# Patient Record
Sex: MALE | Race: WHITE | NOT HISPANIC OR LATINO | Employment: OTHER | URBAN - METROPOLITAN AREA
[De-identification: names, ages, dates, MRNs, and addresses within clinical notes are randomized per-mention and may not be internally consistent; named-entity substitution may affect disease eponyms.]

---

## 2019-07-12 ENCOUNTER — OFFICE VISIT (OUTPATIENT)
Dept: URGENT CARE | Facility: CLINIC | Age: 79
End: 2019-07-12
Payer: COMMERCIAL

## 2019-07-12 VITALS
WEIGHT: 189 LBS | OXYGEN SATURATION: 94 % | HEIGHT: 71 IN | SYSTOLIC BLOOD PRESSURE: 159 MMHG | BODY MASS INDEX: 26.46 KG/M2 | DIASTOLIC BLOOD PRESSURE: 72 MMHG | HEART RATE: 56 BPM | RESPIRATION RATE: 16 BRPM | TEMPERATURE: 96.5 F

## 2019-07-12 DIAGNOSIS — R10.32 LEFT LOWER QUADRANT PAIN: Primary | ICD-10-CM

## 2019-07-12 PROCEDURE — 99203 OFFICE O/P NEW LOW 30 MIN: CPT | Performed by: EMERGENCY MEDICINE

## 2019-07-12 RX ORDER — RIBOFLAVIN (VITAMIN B2) 100 MG
100 TABLET ORAL DAILY
COMMUNITY

## 2019-07-12 RX ORDER — TAMSULOSIN HYDROCHLORIDE 0.4 MG/1
CAPSULE ORAL
Refills: 0 | COMMUNITY
Start: 2019-05-25

## 2019-07-12 RX ORDER — METRONIDAZOLE 7.5 MG/G
1 GEL TOPICAL 2 TIMES DAILY
Refills: 0 | COMMUNITY
Start: 2019-04-24

## 2019-07-12 RX ORDER — MONTELUKAST SODIUM 10 MG/1
TABLET ORAL
Refills: 0 | COMMUNITY
Start: 2019-05-01

## 2019-07-12 RX ORDER — ROSUVASTATIN CALCIUM 10 MG/1
10 TABLET, COATED ORAL DAILY
Refills: 0 | COMMUNITY
Start: 2019-05-06

## 2019-07-12 NOTE — PROGRESS NOTES
[facility] [facility]        NAME: Price Roca is a 70 y o  male  : 1940    MRN: [id number]  DATE: 2019  TIME: 12:34 PM    Assessment and Plan   Left lower quadrant pain [R10 32]  1  Left lower quadrant pain  Ambulatory Referral to Emergency Medicine         Patient Instructions     Patient Instructions   Abdominal Pain   WHAT YOU NEED TO KNOW:   Abdominal pain can be dull, achy, or sharp  You may have pain in one area of your abdomen, or in your entire abdomen  Your pain may be caused by a condition such as constipation, food sensitivity or poisoning, infection, or a blockage  Abdominal pain can also be from a hernia, appendicitis, or an ulcer  Liver, gallbladder, or kidney conditions can also cause abdominal pain  The cause of your abdominal pain may be unknown  DISCHARGE INSTRUCTIONS:   Return to the emergency department if:   · You have new chest pain or shortness of breath  · You have pulsing pain in your upper abdomen or lower back that suddenly becomes constant  · Your pain is in the right lower abdominal area and worsens with movement  · You have a fever over 100 4°F (38°C) or shaking chills  · You are vomiting and cannot keep food or liquids down  · Your pain does not improve or gets worse over the next 8 to 12 hours  · You see blood in your vomit or bowel movements, or they look black and tarry  · Your skin or the whites of your eyes turn yellow  · You are a woman and have a large amount of vaginal bleeding that is not your monthly period  Contact your healthcare provider if:   · You have pain in your lower back  · You are a man and have pain in your testicles  · You have pain when you urinate  · You have questions or concerns about your condition or care  Follow up with your healthcare provider within 24 hours or as directed:  Write down your questions so you remember to ask them during your visits     Medicines:   · Medicines  may be given to calm your stomach and prevent vomiting or to decrease pain  Ask how to take pain medicine safely  · Take your medicine as directed  Contact your healthcare provider if you think your medicine is not helping or if you have side effects  Tell him of her if you are allergic to any medicine  Keep a list of the medicines, vitamins, and herbs you take  Include the amounts, and when and why you take them  Bring the list or the pill bottles to follow-up visits  Carry your medicine list with you in case of an emergency  © 2017 [employer] Information is for End User's use only and may not be sold, redistributed or otherwise used for commercial purposes  All illustrations and images included in CareNotes® are the copyrighted property of [employer]  or [employer]  The above information is an  only  It is not intended as medical advice for individual conditions or treatments  Talk to your doctor, nurse or pharmacist before following any medical regimen to see if it is safe and effective for you  Follow up with PCP in 3-5 days  Proceed to  ER if symptoms worsen  Chief Complaint     Chief Complaint   Patient presents with    Abdominal Pain     LLQ pain starting a couple hours ago, pain is constant  having nausea         History of Present Illness       Patient complains sudden onset of left lower quadrant dull pain 2 hours ago with nausea while he was at [facility] with his wife who was having outpatient knee surgery  As he was driving her home he noted the pain worsening  He had resection of a 10 in section of colon last year at [facility]   He is not clear about the details but  he believes it was due to some type of infection as he was placed in the hospital on antibiotics for several days before they decided to remove the portion of the colon  Patient claims the abdominal pain has improved since he was in our exam room        Review of Systems Review of Systems   Constitutional: Positive for appetite change  Negative for chills and fever  Respiratory: Negative for cough, shortness of breath and wheezing  Cardiovascular: Negative for chest pain and palpitations  Gastrointestinal: Positive for abdominal pain and nausea  Negative for abdominal distention, anal bleeding, blood in stool, constipation, diarrhea, rectal pain and vomiting  Genitourinary: Negative for flank pain  Musculoskeletal: Negative for back pain and neck stiffness  Skin: Negative for pallor  Neurological: Negative for syncope, light-headedness and headaches  Current Medications       Current Outpatient Medications:     Ascorbic Acid (VITAMIN C) 100 MG tablet, Take 100 mg by mouth daily, Disp: , Rfl:     calcium acetate (PHOSLO) 667 mg capsule, Take 1,334 mg by mouth 3 (three) times a day with meals, Disp: , Rfl:     diclofenac sodium (VOLTAREN) 1 %, , Disp: , Rfl: 0    metoprolol tartrate (LOPRESSOR) 25 mg tablet, Take 12 5 mg by mouth 2 (two) times a day, Disp: , Rfl: 0    metroNIDAZOLE (METROGEL) 0 75 % gel, Apply 1 application topically 2 (two) times a day To affected area, Disp: , Rfl: 0    montelukast (SINGULAIR) 10 mg tablet, , Disp: , Rfl: 0    Naproxen Sodium (ALEVE PO), Take by mouth, Disp: , Rfl:     rosuvastatin (CRESTOR) 10 MG tablet, Take 10 mg by mouth daily, Disp: , Rfl: 0    tamsulosin (FLOMAX) 0 4 mg, , Disp: , Rfl: 0    UNKNOWN TO PATIENT, , Disp: , Rfl:     Current Allergies     Allergies as of 07/12/2019    (No Known Allergies)            The following portions of the patient's history were reviewed and updated as appropriate: allergies, current medications, past family history, past medical history, past social history, past surgical history and problem list      Past Medical History:   Diagnosis Date    Asthma     Shoulder pain        Past Surgical History:   Procedure Laterality Date    COLECTOMY  2018       History reviewed   No pertinent family history  Medications have been verified  Objective   /72   Pulse 56   Temp (!) 96 5 °F (35 8 °C) (Tympanic)   Resp 16   Ht 5' 10 5" (1 791 m)   Wt 85 7 kg (189 lb)   SpO2 94%   BMI 26 74 kg/m²        Physical Exam     Physical Exam   Constitutional: He is oriented to person, place, and time  He appears well-developed and well-nourished  No distress  HENT:   Head: Normocephalic and atraumatic  Cardiovascular: Normal rate, regular rhythm and normal heart sounds  Pulmonary/Chest: Effort normal and breath sounds normal    Abdominal: Soft  Bowel sounds are normal  He exhibits no distension and no mass  There is no tenderness  There is no rigidity, no rebound and no guarding  Musculoskeletal: Normal range of motion  Neurological: He is alert and oriented to person, place, and time  Skin: Skin is warm and dry  He is not diaphoretic  No pallor  Psychiatric: He has a normal mood and affect  His behavior is normal  Judgment and thought content normal    Nursing note and vitals reviewed

## 2019-07-12 NOTE — PATIENT INSTRUCTIONS
Abdominal Pain   WHAT YOU NEED TO KNOW:   Abdominal pain can be dull, achy, or sharp  You may have pain in one area of your abdomen, or in your entire abdomen  Your pain may be caused by a condition such as constipation, food sensitivity or poisoning, infection, or a blockage  Abdominal pain can also be from a hernia, appendicitis, or an ulcer  Liver, gallbladder, or kidney conditions can also cause abdominal pain  The cause of your abdominal pain may be unknown  DISCHARGE INSTRUCTIONS:   Return to the emergency department if:   · You have new chest pain or shortness of breath  · You have pulsing pain in your upper abdomen or lower back that suddenly becomes constant  · Your pain is in the right lower abdominal area and worsens with movement  · You have a fever over 100 4°F (38°C) or shaking chills  · You are vomiting and cannot keep food or liquids down  · Your pain does not improve or gets worse over the next 8 to 12 hours  · You see blood in your vomit or bowel movements, or they look black and tarry  · Your skin or the whites of your eyes turn yellow  · You are a woman and have a large amount of vaginal bleeding that is not your monthly period  Contact your healthcare provider if:   · You have pain in your lower back  · You are a man and have pain in your testicles  · You have pain when you urinate  · You have questions or concerns about your condition or care  Follow up with your healthcare provider within 24 hours or as directed:  Write down your questions so you remember to ask them during your visits  Medicines:   · Medicines  may be given to calm your stomach and prevent vomiting or to decrease pain  Ask how to take pain medicine safely  · Take your medicine as directed  Contact your healthcare provider if you think your medicine is not helping or if you have side effects  Tell him of her if you are allergic to any medicine   Keep a list of the medicines, vitamins, and herbs you take  Include the amounts, and when and why you take them  Bring the list or the pill bottles to follow-up visits  Carry your medicine list with you in case of an emergency  © 2017 [employer] Information is for End User's use only and may not be sold, redistributed or otherwise used for commercial purposes  All illustrations and images included in CareNotes® are the copyrighted property of [employer]  or [employer]  The above information is an  only  It is not intended as medical advice for individual conditions or treatments  Talk to your doctor, nurse or pharmacist before following any medical regimen to see if it is safe and effective for you

## 2020-05-11 ENCOUNTER — HOSPITAL ENCOUNTER (EMERGENCY)
Facility: HOSPITAL | Age: 80
Discharge: HOME | End: 2020-05-11
Attending: EMERGENCY MEDICINE | Admitting: EMERGENCY MEDICINE
Payer: COMMERCIAL

## 2020-05-11 ENCOUNTER — APPOINTMENT (EMERGENCY)
Dept: CT IMAGING | Facility: HOSPITAL | Age: 80
End: 2020-05-11
Payer: COMMERCIAL

## 2020-05-11 VITALS
DIASTOLIC BLOOD PRESSURE: 93 MMHG | SYSTOLIC BLOOD PRESSURE: 195 MMHG | OXYGEN SATURATION: 97 % | HEIGHT: 70 IN | RESPIRATION RATE: 20 BRPM | HEART RATE: 68 BPM | WEIGHT: 190.04 LBS | BODY MASS INDEX: 27.21 KG/M2 | TEMPERATURE: 97.7 F

## 2020-05-11 DIAGNOSIS — S01.81XA FACIAL LACERATION: ICD-10-CM

## 2020-05-11 DIAGNOSIS — S09.90XA HEAD INJURY: ICD-10-CM
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
## 2020-05-11 PROCEDURE — 12013 RPR F/E/E/N/L/M 2.6-5.0 CM: CPT | Performed by: EMERGENCY MEDICINE

## 2020-05-11 PROCEDURE — 99282 EMERGENCY DEPT VISIT SF MDM: CPT | Performed by: EMERGENCY MEDICINE

## 2020-05-11 PROCEDURE — 70450 CT HEAD/BRAIN W/O DYE: CPT

## 2020-05-11 PROCEDURE — 99283 EMERGENCY DEPT VISIT LOW MDM: CPT

## 2020-05-11 PROCEDURE — 70486 CT MAXILLOFACIAL W/O DYE: CPT

## 2020-05-11 RX ORDER — ACETAMINOPHEN 325 MG/1
975 TABLET ORAL ONCE
Status: COMPLETED | OUTPATIENT
Start: 2020-05-11 | End: 2020-05-11

## 2020-05-11 RX ORDER — LIDOCAINE HYDROCHLORIDE AND EPINEPHRINE 10; 10 MG/ML; UG/ML
20 INJECTION, SOLUTION INFILTRATION; PERINEURAL ONCE
Status: COMPLETED | OUTPATIENT
Start: 2020-05-11 | End: 2020-05-11

## 2020-05-11 RX ADMIN — LIDOCAINE HYDROCHLORIDE AND EPINEPHRINE 20 ML: 10; 10 INJECTION, SOLUTION INFILTRATION; PERINEURAL at 18:35

## 2020-05-11 RX ADMIN — ACETAMINOPHEN 975 MG: 325 TABLET ORAL at 18:34

## 2020-08-28 ENCOUNTER — HOSPITAL ENCOUNTER (EMERGENCY)
Facility: HOSPITAL | Age: 80
Discharge: HOME | End: 2020-08-28
Attending: EMERGENCY MEDICINE | Admitting: EMERGENCY MEDICINE
Payer: COMMERCIAL

## 2020-08-28 ENCOUNTER — APPOINTMENT (EMERGENCY)
Dept: RADIOLOGY | Facility: HOSPITAL | Age: 80
End: 2020-08-28
Payer: COMMERCIAL

## 2020-08-28 ENCOUNTER — APPOINTMENT (EMERGENCY)
Dept: CT IMAGING | Facility: HOSPITAL | Age: 80
End: 2020-08-28
Payer: COMMERCIAL

## 2020-08-28 VITALS
WEIGHT: 164 LBS | TEMPERATURE: 97 F | OXYGEN SATURATION: 96 % | DIASTOLIC BLOOD PRESSURE: 73 MMHG | HEART RATE: 103 BPM | RESPIRATION RATE: 20 BRPM | HEIGHT: 70 IN | BODY MASS INDEX: 23.48 KG/M2 | SYSTOLIC BLOOD PRESSURE: 125 MMHG

## 2020-08-28 DIAGNOSIS — U07.1 COVID-19: Primary | ICD-10-CM

## 2020-08-28 LAB
ALBUMIN SERPL BCP-MCNC: 3.7 G/DL (ref 3.5–5)
ALP SERPL-CCNC: 61 U/L (ref 46–116)
ALT SERPL W P-5'-P-CCNC: 42 U/L (ref 12–78)
ANION GAP SERPL CALCULATED.3IONS-SCNC: 9 MMOL/L (ref 4–13)
AST SERPL W P-5'-P-CCNC: 45 U/L (ref 5–45)
BASOPHILS # BLD AUTO: 0.01 THOUSANDS/ΜL (ref 0–0.1)
BASOPHILS NFR BLD AUTO: 0 % (ref 0–1)
BILIRUB SERPL-MCNC: 0.78 MG/DL (ref 0.2–1)
BUN SERPL-MCNC: 21 MG/DL (ref 5–25)
CALCIUM SERPL-MCNC: 9.1 MG/DL (ref 8.3–10.1)
CHLORIDE SERPL-SCNC: 102 MMOL/L (ref 100–108)
CO2 SERPL-SCNC: 28 MMOL/L (ref 21–32)
CREAT SERPL-MCNC: 0.99 MG/DL (ref 0.6–1.3)
EOSINOPHIL # BLD AUTO: 0 THOUSAND/ΜL (ref 0–0.61)
EOSINOPHIL NFR BLD AUTO: 0 % (ref 0–6)
ERYTHROCYTE [DISTWIDTH] IN BLOOD BY AUTOMATED COUNT: 11.5 % (ref 11.6–15.1)
GFR SERPL CREATININE-BSD FRML MDRD: 73 ML/MIN/1.73SQ M
GLUCOSE SERPL-MCNC: 113 MG/DL (ref 65–140)
HCT VFR BLD AUTO: 45.1 % (ref 36.5–49.3)
HGB BLD-MCNC: 15.4 G/DL (ref 12–17)
IMM GRANULOCYTES # BLD AUTO: 0.01 THOUSAND/UL (ref 0–0.2)
IMM GRANULOCYTES NFR BLD AUTO: 0 % (ref 0–2)
LACTATE SERPL-SCNC: 1.3 MMOL/L (ref 0.5–2)
LYMPHOCYTES # BLD AUTO: 0.74 THOUSANDS/ΜL (ref 0.6–4.47)
LYMPHOCYTES NFR BLD AUTO: 19 % (ref 14–44)
MCH RBC QN AUTO: 31 PG (ref 26.8–34.3)
MCHC RBC AUTO-ENTMCNC: 34.1 G/DL (ref 31.4–37.4)
MCV RBC AUTO: 91 FL (ref 82–98)
MONOCYTES # BLD AUTO: 0.66 THOUSAND/ΜL (ref 0.17–1.22)
MONOCYTES NFR BLD AUTO: 17 % (ref 4–12)
NEUTROPHILS # BLD AUTO: 2.45 THOUSANDS/ΜL (ref 1.85–7.62)
NEUTS SEG NFR BLD AUTO: 64 % (ref 43–75)
NRBC BLD AUTO-RTO: 0 /100 WBCS
PLATELET # BLD AUTO: 103 THOUSANDS/UL (ref 149–390)
PMV BLD AUTO: 11.4 FL (ref 8.9–12.7)
POTASSIUM SERPL-SCNC: 4.4 MMOL/L (ref 3.5–5.3)
PROT SERPL-MCNC: 7.1 G/DL (ref 6.4–8.2)
RBC # BLD AUTO: 4.96 MILLION/UL (ref 3.88–5.62)
SARS-COV-2 RNA RESP QL NAA+PROBE: POSITIVE
SODIUM SERPL-SCNC: 139 MMOL/L (ref 136–145)
TROPONIN I SERPL-MCNC: 0.03 NG/ML
WBC # BLD AUTO: 3.87 THOUSAND/UL (ref 4.31–10.16)

## 2020-08-28 PROCEDURE — 80053 COMPREHEN METABOLIC PANEL: CPT | Performed by: PHYSICIAN ASSISTANT

## 2020-08-28 PROCEDURE — 96374 THER/PROPH/DIAG INJ IV PUSH: CPT

## 2020-08-28 PROCEDURE — 96375 TX/PRO/DX INJ NEW DRUG ADDON: CPT

## 2020-08-28 PROCEDURE — 71045 X-RAY EXAM CHEST 1 VIEW: CPT

## 2020-08-28 PROCEDURE — 84484 ASSAY OF TROPONIN QUANT: CPT | Performed by: PHYSICIAN ASSISTANT

## 2020-08-28 PROCEDURE — 96361 HYDRATE IV INFUSION ADD-ON: CPT

## 2020-08-28 PROCEDURE — 87635 SARS-COV-2 COVID-19 AMP PRB: CPT | Performed by: PHYSICIAN ASSISTANT

## 2020-08-28 PROCEDURE — 93005 ELECTROCARDIOGRAM TRACING: CPT

## 2020-08-28 PROCEDURE — 99285 EMERGENCY DEPT VISIT HI MDM: CPT

## 2020-08-28 PROCEDURE — 74177 CT ABD & PELVIS W/CONTRAST: CPT

## 2020-08-28 PROCEDURE — 85025 COMPLETE CBC W/AUTO DIFF WBC: CPT | Performed by: PHYSICIAN ASSISTANT

## 2020-08-28 PROCEDURE — 99285 EMERGENCY DEPT VISIT HI MDM: CPT | Performed by: PHYSICIAN ASSISTANT

## 2020-08-28 PROCEDURE — G1004 CDSM NDSC: HCPCS

## 2020-08-28 PROCEDURE — 36415 COLL VENOUS BLD VENIPUNCTURE: CPT | Performed by: PHYSICIAN ASSISTANT

## 2020-08-28 PROCEDURE — 83605 ASSAY OF LACTIC ACID: CPT | Performed by: PHYSICIAN ASSISTANT

## 2020-08-28 RX ORDER — DEXAMETHASONE SODIUM PHOSPHATE 10 MG/ML
10 INJECTION, SOLUTION INTRAMUSCULAR; INTRAVENOUS ONCE
Status: COMPLETED | OUTPATIENT
Start: 2020-08-28 | End: 2020-08-28

## 2020-08-28 RX ORDER — ONDANSETRON 2 MG/ML
4 INJECTION INTRAMUSCULAR; INTRAVENOUS ONCE
Status: COMPLETED | OUTPATIENT
Start: 2020-08-28 | End: 2020-08-28

## 2020-08-28 RX ADMIN — DEXAMETHASONE SODIUM PHOSPHATE 10 MG: 10 INJECTION INTRAMUSCULAR; INTRAVENOUS at 15:29

## 2020-08-28 RX ADMIN — IOHEXOL 100 ML: 350 INJECTION, SOLUTION INTRAVENOUS at 15:03

## 2020-08-28 RX ADMIN — SODIUM CHLORIDE 1000 ML: 0.9 INJECTION, SOLUTION INTRAVENOUS at 14:00

## 2020-08-28 RX ADMIN — ONDANSETRON 4 MG: 2 INJECTION INTRAMUSCULAR; INTRAVENOUS at 14:00

## 2020-08-28 NOTE — ED PROVIDER NOTES
History  Chief Complaint   Patient presents with    Weakness - Generalized     pt arrives reporting his wife was dx covid + yesterday  pt is here because he was advised by PCP he should be tested ASAP because he is also feeling weakness, joint pain, and has a slight cough  59-year-old male presents the emergency department for evaluation of generalized weakness and fatigue  He additionally reports loss of taste and appetite  Patient reports occasional palpitations and increased shortness of breath  He reports history of asthma notes chronic cough  Denies any production with cough  Patient denies any chest pain  Patient denies any leg swelling  Notes wife was recently admitted to [facility]  Patient admits to LL abdominal pain  He admits to mild nausea  Denies vomiting, constipation  Patient denies fevers or chills  PMH: afib, asthma  During my patient encounter, I wore the following PPE: N95 with cover mask, Eye Protection, Gown and Gloves  The patient was masked              Prior to Admission Medications   Prescriptions Last Dose Informant Patient Reported? Taking?    Ascorbic Acid (VITAMIN C) 100 MG tablet 8/28/2020 at Unknown time  Yes Yes   Sig: Take 100 mg by mouth daily   Naproxen Sodium (ALEVE PO) 8/28/2020 at Unknown time  Yes Yes   Sig: Take by mouth   UNKNOWN TO PATIENT   Yes No   calcium acetate (PHOSLO) 667 mg capsule 8/28/2020 at Unknown time  Yes Yes   Sig: Take 1,334 mg by mouth 3 (three) times a day with meals   diclofenac sodium (VOLTAREN) 1 % 8/28/2020 at Unknown time  Yes Yes   metoprolol tartrate (LOPRESSOR) 25 mg tablet 8/28/2020 at Unknown time  Yes Yes   Sig: Take 12 5 mg by mouth 2 (two) times a day   metroNIDAZOLE (METROGEL) 0 75 % gel 8/28/2020 at Unknown time  Yes Yes   Sig: Apply 1 application topically 2 (two) times a day To affected area   montelukast (SINGULAIR) 10 mg tablet 8/28/2020 at Unknown time  Yes Yes   rosuvastatin (CRESTOR) 10 MG tablet 8/27/2020 at Unknown time Yes Yes   Sig: Take 10 mg by mouth daily   tamsulosin (FLOMAX) 0 4 mg 8/28/2020 at Unknown time  Yes Yes      Facility-Administered Medications: None       Past Medical History:   Diagnosis Date    Asthma     Hypertension     Kidney stones     Renal disorder     frequency    Shoulder pain        Past Surgical History:   Procedure Laterality Date    COLECTOMY  2018    CYSTOSCOPY      kidney stone retrieval    JOINT REPLACEMENT Right     knee       History reviewed  No pertinent family history  I have reviewed and agree with the history as documented  E-Cigarette/Vaping     E-Cigarette/Vaping Substances     Social History     Tobacco Use    Smoking status: Never Smoker    Smokeless tobacco: Never Used   Substance Use Topics    Alcohol use: Not Currently     Frequency: Monthly or less     Comment: rarely    Drug use: Never       Review of Systems   Constitutional: Positive for activity change (decreased), appetite change, fatigue and fever  Negative for chills and diaphoresis  HENT: Negative for congestion, ear discharge, ear pain, facial swelling, sinus pressure, sinus pain, sneezing, sore throat, tinnitus, trouble swallowing and voice change  Eyes: Negative for visual disturbance  Respiratory: Positive for cough and shortness of breath  Negative for choking, chest tightness, wheezing and stridor  Cardiovascular: Positive for palpitations  Negative for chest pain and leg swelling  Gastrointestinal: Positive for abdominal pain and nausea  Negative for abdominal distention, anal bleeding, blood in stool, constipation, diarrhea, rectal pain and vomiting  Genitourinary: Negative  Musculoskeletal: Positive for myalgias  Negative for arthralgias, back pain, neck pain and neck stiffness  Skin: Negative for color change, rash and wound  Neurological: Negative for dizziness, tremors, seizures, syncope, facial asymmetry, speech difficulty, weakness, light-headedness, numbness and headaches  Psychiatric/Behavioral: Negative  All other systems reviewed and are negative  Physical Exam  Physical Exam  Vitals signs and nursing note reviewed  Constitutional:       General: He is not in acute distress  Appearance: He is not toxic-appearing  HENT:      Head: Normocephalic and atraumatic  Right Ear: Tympanic membrane, ear canal and external ear normal       Left Ear: Tympanic membrane, ear canal and external ear normal       Nose: Nose normal       Mouth/Throat:      Mouth: Mucous membranes are moist       Pharynx: No oropharyngeal exudate or posterior oropharyngeal erythema  Eyes:      Extraocular Movements: Extraocular movements intact  Conjunctiva/sclera: Conjunctivae normal       Pupils: Pupils are equal, round, and reactive to light  Neck:      Musculoskeletal: Normal range of motion and neck supple  Cardiovascular:      Rate and Rhythm: Normal rate and regular rhythm  Heart sounds: No murmur  Pulmonary:      Effort: Pulmonary effort is normal  No respiratory distress  Breath sounds: Normal breath sounds  No stridor  No wheezing, rhonchi or rales  Chest:      Chest wall: No tenderness  Abdominal:      General: Abdomen is flat  Bowel sounds are normal  There is no distension  Palpations: There is no mass  Tenderness: There is abdominal tenderness (minimal) in the left lower quadrant  There is no right CVA tenderness, left CVA tenderness or guarding  Musculoskeletal: Normal range of motion  General: No swelling or tenderness  Right lower leg: No edema  Left lower leg: No edema  Lymphadenopathy:      Cervical: No cervical adenopathy  Skin:     General: Skin is warm and dry  Capillary Refill: Capillary refill takes less than 2 seconds  Coloration: Skin is not pale  Findings: No bruising, erythema or rash  Neurological:      General: No focal deficit present        Mental Status: He is alert and oriented to person, place, and time  Sensory: No sensory deficit  Motor: No weakness  Coordination: Coordination normal       Gait: Gait normal       Deep Tendon Reflexes: Reflexes normal    Psychiatric:         Mood and Affect: Mood normal          Behavior: Behavior normal          Thought Content: Thought content normal          Judgment: Judgment normal          Vital Signs  ED Triage Vitals [08/28/20 1323]   Temperature Pulse Respirations Blood Pressure SpO2   (!) 97 °F (36 1 °C) (!) 107 18 130/72 96 %      Temp Source Heart Rate Source Patient Position - Orthostatic VS BP Location FiO2 (%)   Temporal Monitor -- -- --      Pain Score       2           Vitals:    08/28/20 1530 08/28/20 1545 08/28/20 1600 08/28/20 1645   BP: 113/70  125/73    Pulse: 86 91 87 103         Visual Acuity      ED Medications  Medications   sodium chloride 0 9 % bolus 1,000 mL (0 mL Intravenous Stopped 8/28/20 1525)   ondansetron (ZOFRAN) injection 4 mg (4 mg Intravenous Given 8/28/20 1400)   dexamethasone (PF) (DECADRON) injection 10 mg (10 mg Intravenous Given 8/28/20 1529)   iohexol (OMNIPAQUE) 350 MG/ML injection (MULTI-DOSE) 100 mL (100 mL Intravenous Given 8/28/20 1503)       Diagnostic Studies  Results Reviewed     Procedure Component Value Units Date/Time    Novel Coronavirus [facility] [[id number]]  (Abnormal) Collected:  08/28/20 1359    Lab Status:  Final result Specimen:  Nares from Nose Updated:  08/28/20 1459     SARS-CoV-2 Positive    Narrative: The specimen collection materials, transport medium, and/or testing methodology utilized in the production of these test results have been proven to be reliable in a limited validation with an abbreviated program under the Emergency Utilization Authorization provided by the FDA  Testing reported as "Presumptive positive" will be confirmed with secondary testing with a reference laboratory to ensure result accuracy    Clinical caution and judgement should be used with the interpretation of these results with consideration of the clinical impression and other laboratory testing  Testing reported as "Positive" or "Negative" has been proven to be accurate according to standard laboratory validation requirements  All testing is performed with control materials showing appropriate reactivity at standard intervals  Lactic acid [[id number]]  (Normal) Collected:  08/28/20 1359    Lab Status:  Final result Specimen:  Blood from Arm, Left Updated:  08/28/20 1438     LACTIC ACID 1 3 mmol/L     Narrative:       Result may be elevated if tourniquet was used during collection      Troponin I [[id number]]  (Normal) Collected:  08/28/20 1359    Lab Status:  Final result Specimen:  Blood from Arm, Left Updated:  08/28/20 1426     Troponin I 0 03 ng/mL     Comprehensive metabolic panel [[id number]] Collected:  08/28/20 1359    Lab Status:  Final result Specimen:  Blood from Arm, Left Updated:  08/28/20 1425     Sodium 139 mmol/L      Potassium 4 4 mmol/L      Chloride 102 mmol/L      CO2 28 mmol/L      ANION GAP 9 mmol/L      BUN 21 mg/dL      Creatinine 0 99 mg/dL      Glucose 113 mg/dL      Calcium 9 1 mg/dL      AST 45 U/L      ALT 42 U/L      Alkaline Phosphatase 61 U/L      Total Protein 7 1 g/dL      Albumin 3 7 g/dL      Total Bilirubin 0 78 mg/dL      eGFR 73 ml/min/1 73sq m     Narrative:       [place] guidelines for Chronic Kidney Disease (CKD):     Stage 1 with normal or high GFR (GFR > 90 mL/min/1 73 square meters)    Stage 2 Mild CKD (GFR = 60-89 mL/min/1 73 square meters)    Stage 3A Moderate CKD (GFR = 45-59 mL/min/1 73 square meters)    Stage 3B Moderate CKD (GFR = 30-44 mL/min/1 73 square meters)    Stage 4 Severe CKD (GFR = 15-29 mL/min/1 73 square meters)    Stage 5 End Stage CKD (GFR <15 mL/min/1 73 square meters)  Note: GFR calculation is accurate only with a steady state creatinine    CBC and differential [[id number]]  (Abnormal) Collected:  08/28/20 1359    Lab Status:  Final result Specimen:  Blood from Arm, Left Updated:  08/28/20 1416     WBC 3 87 Thousand/uL      RBC 4 96 Million/uL      Hemoglobin 15 4 g/dL      Hematocrit 45 1 %      MCV 91 fL      MCH 31 0 pg      MCHC 34 1 g/dL      RDW 11 5 %      MPV 11 4 fL      Platelets 478 Thousands/uL      nRBC 0 /100 WBCs      Neutrophils Relative 64 %      Immat GRANS % 0 %      Lymphocytes Relative 19 %      Monocytes Relative 17 %      Eosinophils Relative 0 %      Basophils Relative 0 %      Neutrophils Absolute 2 45 Thousands/µL      Immature Grans Absolute 0 01 Thousand/uL      Lymphocytes Absolute 0 74 Thousands/µL      Monocytes Absolute 0 66 Thousand/µL      Eosinophils Absolute 0 00 Thousand/µL      Basophils Absolute 0 01 Thousands/µL                  CT abdomen pelvis with contrast   Final Result by Darya Verma MD (08/28 1512)      There is pelvic free fluid of uncertain etiology  There is some atelectasis of the lung bases  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  There is a small nonobstructing left kidney stone and there are probable small right renal cysts  Workstation performed: [id number]         XR chest 1 view portable   Final Result by Woodward Dancer, MD (08/28 1415)      No acute cardiopulmonary disease              Workstation performed: [id number]                    Procedures  ECG 12 Lead Documentation Only    Date/Time: 8/28/2020 2:38 PM  Performed by: Sudhir Olivares PA-C  Authorized by: Sudhir Olivares PA-C     Indications / Diagnosis:  Tachycardia  Patient location:  ED  Rate:     ECG rate:  106    ECG rate assessment: tachycardic    Rhythm:     Rhythm: atrial fibrillation    Ectopy:     Ectopy: none    QRS:     QRS axis:  Normal    QRS intervals:  Normal  Conduction:     Conduction: normal    ST segments:     ST segments:  Normal             ED Course  ED Course as of Aug 28 2039   Fri Aug 28, 2020   1403 EKG: a fib with RVR, vent rate 106 bpm, no acute ischemic changes      1448 Chest xray: No acute cardiopulmonary disease  1451 WBC: 3 87  CMP unremarkable   Troponin 0 03  Lactic acid 1 3      1459 COVID Positive      1521 CT abd: There is pelvic free fluid of uncertain etiology      There is some atelectasis of the lung bases      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      There is a small nonobstructing left kidney stone and there are probable small right renal cysts  1526 TT sent to hospitalist requesting admission      36 Hospitalist Dr Fanny Bill feels patient is stable for discharge  Patient able to ambulate with pulse ox of 93%  3545 Patient was educated on results, findings, symptomatic treatment and symptoms that require prompt return to the ED for further evaluation and verbalized understanding  He was educated on CT findings and appropriate follow-up  Patient was educated on quarantine at home  Patient agrees with treatment plan, he remained well ED and was discharged home          US AUDIT      Most Recent Value   Initial Alcohol Screen: US AUDIT-C    1  How often do you have a drink containing alcohol?  0 Filed at: 08/28/2020 1325   2  How many drinks containing alcohol do you have on a typical day you are drinking? 0 Filed at: 08/28/2020 1325   3a  Male UNDER 65: How often do you have five or more drinks on one occasion? 0 Filed at: 08/28/2020 1325   3b  FEMALE Any Age, or MALE 65+: How often do you have 4 or more drinks on one occassion? 0 Filed at: 08/28/2020 1325   Audit-C Score  0 Filed at: 08/28/2020 1325                  SHELBY/DAST-10      Most Recent Value   How many times in the past year have you    Used an illegal drug or used a prescription medication for non-medical reasons?   Never Filed at: 08/28/2020 1325                MDM  Number of Diagnoses or Management Options  COVID-19: new and requires workup  Diagnosis management comments: Differential diagnosis includes but not limited to:  COVID, viral syndrome, diverticulitis  Vitals and medical record reviewed  Physical exam was significant for left lower quadrant abdominal tenderness  Lungs were clear auscultation  Chest x-ray was negative for acute findings  Patient had multiple findings on CT scan most significant small amount of free fluid in the pelvic area with unknown etiology which was discussed with patient, there is no free air  Patient will follow-up with his PCP for continued evaluation  COVID +  WBC 3 87  Case was discussed with hospitalist who felt patient was stable for discharge  Patient is agreeable with this plan he remained well while in the emergency department is able to tolerate p o  Intake was discharged home with quarantine and follow-up instructions  Amount and/or Complexity of Data Reviewed  Clinical lab tests: ordered and reviewed  Tests in the radiology section of CPT®: ordered and reviewed  Review and summarize past medical records: yes  Discuss the patient with other providers: yes  Independent visualization of images, tracings, or specimens: yes          Disposition  Final diagnoses:   COVID-19     Time reflects when diagnosis was documented in both MDM as applicable and the Disposition within this note     Time User Action Codes Description Comment    8/28/2020  4:35 PM Mauricio Gorman Add [U07 1] COVID-19       ED Disposition     ED Disposition Condition Date/Time Comment    Discharge Stable Fri Aug 28, 2020  4:34 PM Michelle Chong discharge to home/self care              Follow-up Information     Follow up With Specialties Details Why Contact Info    Bonnie Gunter MD Family Medicine In 1 week As needed, If symptoms worsen [address] [place] [address] [address] [employer] [address]  [phone]            Discharge Medication List as of 8/28/2020  4:35 PM      CONTINUE these medications which have NOT CHANGED    Details   Ascorbic Acid (VITAMIN C) 100 MG tablet Take 100 mg by mouth daily, Historical Med      calcium acetate (PHOSLO) 667 mg capsule Take 1,334 mg by mouth 3 (three) times a day with meals, Historical Med      diclofenac sodium (VOLTAREN) 1 % Starting Wed 4/24/2019, Historical Med      metoprolol tartrate (LOPRESSOR) 25 mg tablet Take 12 5 mg by mouth 2 (two) times a day, Starting Mon 5/13/2019, Historical Med      metroNIDAZOLE (METROGEL) 0 75 % gel Apply 1 application topically 2 (two) times a day To affected area, Starting Wed 4/24/2019, Historical Med      montelukast (SINGULAIR) 10 mg tablet Starting Wed 5/1/2019, Historical Med      Naproxen Sodium (ALEVE PO) Take by mouth, Historical Med      rosuvastatin (CRESTOR) 10 MG tablet Take 10 mg by mouth daily, Starting Mon 5/6/2019, Historical Med      tamsulosin (FLOMAX) 0 4 mg Starting Sat 5/25/2019, Historical Med      UNKNOWN TO PATIENT Historical Med           No discharge procedures on file      PDMP Review     None          ED Provider  Electronically Signed by           Collins Villareal PA-C  08/28/20 2039

## 2020-08-28 NOTE — DISCHARGE INSTRUCTIONS
Please quarantine at home until you are symptom-free for at least 72 hours without use of fever reducing medications

## 2020-08-29 ENCOUNTER — TELEPHONE (OUTPATIENT)
Dept: EMERGENCY DEPT | Facility: HOSPITAL | Age: 80
End: 2020-08-29

## 2020-08-29 NOTE — TELEPHONE ENCOUNTER
Patient reports he is feeling well  Notes he is still fatigued but otherwise feeling well  Denies any abdominal pain or shortness of breath at this time  Patient was educated on symptoms that require prompt return to the ED for further evaluation verbalized understanding  He was further educated on quarantine instruction  Patient verbalized understanding to all the above

## 2020-08-31 LAB
ATRIAL RATE: 131 BPM
QRS AXIS: 66 DEGREES
QRSD INTERVAL: 74 MS
QT INTERVAL: 278 MS
QTC INTERVAL: 369 MS
T WAVE AXIS: 54 DEGREES
VENTRICULAR RATE: 106 BPM

## 2020-08-31 PROCEDURE — 93010 ELECTROCARDIOGRAM REPORT: CPT | Performed by: INTERNAL MEDICINE

## 2021-02-12 ENCOUNTER — IMMUNIZATIONS (OUTPATIENT)
Dept: FAMILY MEDICINE CLINIC | Facility: HOSPITAL | Age: 81
End: 2021-02-12

## 2021-02-12 DIAGNOSIS — Z23 ENCOUNTER FOR IMMUNIZATION: Primary | ICD-10-CM

## 2021-02-12 PROCEDURE — 0011A SARS-COV-2 / COVID-19 MRNA VACCINE (MODERNA) 100 MCG: CPT | Performed by: EMERGENCY MEDICINE

## 2021-02-12 PROCEDURE — 91301 SARS-COV-2 / COVID-19 MRNA VACCINE (MODERNA) 100 MCG: CPT | Performed by: EMERGENCY MEDICINE

## 2021-03-10 ENCOUNTER — IMMUNIZATIONS (OUTPATIENT)
Dept: FAMILY MEDICINE CLINIC | Facility: HOSPITAL | Age: 81
End: 2021-03-10

## 2021-03-10 DIAGNOSIS — Z23 ENCOUNTER FOR IMMUNIZATION: Primary | ICD-10-CM

## 2021-03-10 PROCEDURE — 91301 SARS-COV-2 / COVID-19 MRNA VACCINE (MODERNA) 100 MCG: CPT

## 2021-03-10 PROCEDURE — 0012A SARS-COV-2 / COVID-19 MRNA VACCINE (MODERNA) 100 MCG: CPT

## 2021-06-04 ENCOUNTER — CLINICAL SUPPORT (OUTPATIENT)
Dept: URGENT CARE | Facility: CLINIC | Age: 81
End: 2021-06-04
Payer: COMMERCIAL

## 2021-06-04 ENCOUNTER — HOSPITAL ENCOUNTER (EMERGENCY)
Facility: HOSPITAL | Age: 81
Discharge: HOME | End: 2021-06-04
Attending: EMERGENCY MEDICINE
Payer: COMMERCIAL

## 2021-06-04 ENCOUNTER — TRANSCRIBE ORDERS (OUTPATIENT)
Dept: ADMINISTRATIVE | Facility: HOSPITAL | Age: 81
End: 2021-06-04

## 2021-06-04 ENCOUNTER — APPOINTMENT (EMERGENCY)
Dept: RADIOLOGY | Facility: HOSPITAL | Age: 81
End: 2021-06-04
Payer: COMMERCIAL

## 2021-06-04 VITALS
HEART RATE: 74 BPM | RESPIRATION RATE: 19 BRPM | SYSTOLIC BLOOD PRESSURE: 124 MMHG | OXYGEN SATURATION: 97 % | WEIGHT: 172.4 LBS | DIASTOLIC BLOOD PRESSURE: 81 MMHG | BODY MASS INDEX: 24.68 KG/M2 | TEMPERATURE: 97.1 F | HEIGHT: 70 IN

## 2021-06-04 DIAGNOSIS — Z01.810 PRE-OPERATIVE CARDIOVASCULAR EXAMINATION: Primary | ICD-10-CM

## 2021-06-04 DIAGNOSIS — I48.92 ATRIAL FLUTTER (HCC): Primary | ICD-10-CM

## 2021-06-04 DIAGNOSIS — Z01.810 PRE-OPERATIVE CARDIOVASCULAR EXAMINATION: ICD-10-CM

## 2021-06-04 LAB
ALBUMIN SERPL BCP-MCNC: 4.2 G/DL (ref 3.5–5)
ALP SERPL-CCNC: 64 U/L (ref 46–116)
ALT SERPL W P-5'-P-CCNC: 30 U/L (ref 12–78)
ANION GAP SERPL CALCULATED.3IONS-SCNC: 5 MMOL/L (ref 4–13)
AST SERPL W P-5'-P-CCNC: 26 U/L (ref 5–45)
BASOPHILS # BLD AUTO: 0.02 THOUSANDS/ΜL (ref 0–0.1)
BASOPHILS NFR BLD AUTO: 0 % (ref 0–1)
BILIRUB SERPL-MCNC: 0.93 MG/DL (ref 0.2–1)
BUN SERPL-MCNC: 24 MG/DL (ref 5–25)
CALCIUM SERPL-MCNC: 9 MG/DL (ref 8.3–10.1)
CHLORIDE SERPL-SCNC: 105 MMOL/L (ref 100–108)
CO2 SERPL-SCNC: 31 MMOL/L (ref 21–32)
CREAT SERPL-MCNC: 1.1 MG/DL (ref 0.6–1.3)
EOSINOPHIL # BLD AUTO: 0.04 THOUSAND/ΜL (ref 0–0.61)
EOSINOPHIL NFR BLD AUTO: 1 % (ref 0–6)
ERYTHROCYTE [DISTWIDTH] IN BLOOD BY AUTOMATED COUNT: 11.5 % (ref 11.6–15.1)
GFR SERPL CREATININE-BSD FRML MDRD: 64 ML/MIN/1.73SQ M
GLUCOSE SERPL-MCNC: 115 MG/DL (ref 65–140)
HCT VFR BLD AUTO: 45.2 % (ref 36.5–49.3)
HGB BLD-MCNC: 15.1 G/DL (ref 12–17)
IMM GRANULOCYTES # BLD AUTO: 0.02 THOUSAND/UL (ref 0–0.2)
IMM GRANULOCYTES NFR BLD AUTO: 0 % (ref 0–2)
LYMPHOCYTES # BLD AUTO: 0.76 THOUSANDS/ΜL (ref 0.6–4.47)
LYMPHOCYTES NFR BLD AUTO: 16 % (ref 14–44)
MAGNESIUM SERPL-MCNC: 2.3 MG/DL (ref 1.6–2.6)
MCH RBC QN AUTO: 31.4 PG (ref 26.8–34.3)
MCHC RBC AUTO-ENTMCNC: 33.4 G/DL (ref 31.4–37.4)
MCV RBC AUTO: 94 FL (ref 82–98)
MONOCYTES # BLD AUTO: 0.33 THOUSAND/ΜL (ref 0.17–1.22)
MONOCYTES NFR BLD AUTO: 7 % (ref 4–12)
NEUTROPHILS # BLD AUTO: 3.56 THOUSANDS/ΜL (ref 1.85–7.62)
NEUTS SEG NFR BLD AUTO: 76 % (ref 43–75)
NRBC BLD AUTO-RTO: 0 /100 WBCS
NT-PROBNP SERPL-MCNC: 1080 PG/ML
PLATELET # BLD AUTO: 151 THOUSANDS/UL (ref 149–390)
PMV BLD AUTO: 10.6 FL (ref 8.9–12.7)
POTASSIUM SERPL-SCNC: 4.6 MMOL/L (ref 3.5–5.3)
PROT SERPL-MCNC: 7.2 G/DL (ref 6.4–8.2)
RBC # BLD AUTO: 4.81 MILLION/UL (ref 3.88–5.62)
SODIUM SERPL-SCNC: 141 MMOL/L (ref 136–145)
TROPONIN I SERPL-MCNC: <0.02 NG/ML
WBC # BLD AUTO: 4.73 THOUSAND/UL (ref 4.31–10.16)

## 2021-06-04 PROCEDURE — 93005 ELECTROCARDIOGRAM TRACING: CPT

## 2021-06-04 PROCEDURE — 84484 ASSAY OF TROPONIN QUANT: CPT | Performed by: PHYSICIAN ASSISTANT

## 2021-06-04 PROCEDURE — 83880 ASSAY OF NATRIURETIC PEPTIDE: CPT | Performed by: PHYSICIAN ASSISTANT

## 2021-06-04 PROCEDURE — 80053 COMPREHEN METABOLIC PANEL: CPT | Performed by: PHYSICIAN ASSISTANT

## 2021-06-04 PROCEDURE — 99285 EMERGENCY DEPT VISIT HI MDM: CPT | Performed by: PHYSICIAN ASSISTANT

## 2021-06-04 PROCEDURE — 83735 ASSAY OF MAGNESIUM: CPT | Performed by: PHYSICIAN ASSISTANT

## 2021-06-04 PROCEDURE — 85025 COMPLETE CBC W/AUTO DIFF WBC: CPT | Performed by: PHYSICIAN ASSISTANT

## 2021-06-04 PROCEDURE — 96360 HYDRATION IV INFUSION INIT: CPT

## 2021-06-04 PROCEDURE — 36415 COLL VENOUS BLD VENIPUNCTURE: CPT | Performed by: PHYSICIAN ASSISTANT

## 2021-06-04 PROCEDURE — 99285 EMERGENCY DEPT VISIT HI MDM: CPT

## 2021-06-04 PROCEDURE — 71045 X-RAY EXAM CHEST 1 VIEW: CPT

## 2021-06-04 RX ORDER — ASPIRIN 81 MG/1
81 TABLET, CHEWABLE ORAL DAILY
Qty: 20 TABLET | Refills: 0 | Status: SHIPPED | OUTPATIENT
Start: 2021-06-04

## 2021-06-04 RX ADMIN — SODIUM CHLORIDE 500 ML: 0.9 INJECTION, SOLUTION INTRAVENOUS at 14:48

## 2021-06-04 RX ADMIN — APIXABAN 5 MG: 5 TABLET, FILM COATED ORAL at 16:06

## 2021-06-04 NOTE — PROGRESS NOTES
Malgorzata Fredrick had walk-in EKG completed on 06/04/21 at 12:59 PM by Shamika Blanc, SONDRA  EKG was abnormal, called ordering providers office who said they would call me back  After 25 minutes still no call back from office  Advised pt and wife to go to emergency room to be evaluated  Wife and pt verbalize understanding and are heading to [facility] via private vehicle

## 2021-06-04 NOTE — DISCHARGE INSTRUCTIONS
Please continue aspirin 81 mg daily and start Eliquis   Do not take any NSAIDS with this such as motrin, Advil, or ibuprofen   Please follow-up with cardiology as discussed

## 2021-06-04 NOTE — ED PROVIDER NOTES
History  Chief Complaint   Patient presents with    Abnormal ECG     Pt went to get pre-op testing, found to be in Aflutter today, sent to ED for eval , pt states she has had some fluttering in chest for the past 3 days  The patient is a 68year old male, who presents to the ED for abnormal EKG  PTA he was at urgent care for pre-op testing  He was found to have aflutter today and was sent here for further evaluation  He states that he has felt a fluttering in his chest x 3 days  He denies any SOB, chest pain, N/V, abdominal pain, dizziness, falls or traumas  He states that when he was ill approximately 1 year ago he had Afib, and takes aspirin daily  Palpitations  Palpitations quality:  Unable to specify  Onset quality:  Gradual  Duration:  3 days  Timing:  Constant  Progression:  Unchanged  Chronicity:  New  Relieved by:  None tried  Worsened by:  Nothing  Ineffective treatments:  None tried  Associated symptoms: no back pain, no chest pain, no chest pressure, no cough, no diaphoresis, no dizziness, no hemoptysis, no leg pain, no lower extremity edema, no malaise/fatigue, no nausea, no near-syncope, no numbness, no orthopnea, no shortness of breath, no syncope, no vomiting and no weakness    Risk factors: hx of atrial fibrillation        Prior to Admission Medications   Prescriptions Last Dose Informant Patient Reported? Taking?    Ascorbic Acid (VITAMIN C) 100 MG tablet   Yes No   Sig: Take 100 mg by mouth daily   Naproxen Sodium (ALEVE PO)   Yes No   Sig: Take by mouth   UNKNOWN TO PATIENT   Yes No   calcium acetate (PHOSLO) 667 mg capsule   Yes No   Sig: Take 1,334 mg by mouth 3 (three) times a day with meals   diclofenac sodium (VOLTAREN) 1 %   Yes No   metoprolol tartrate (LOPRESSOR) 25 mg tablet   Yes No   Sig: Take 12 5 mg by mouth 2 (two) times a day   metroNIDAZOLE (METROGEL) 0 75 % gel   Yes No   Sig: Apply 1 application topically 2 (two) times a day To affected area   montelukast (SINGULAIR) 10 mg tablet   Yes No   rosuvastatin (CRESTOR) 10 MG tablet   Yes No   Sig: Take 10 mg by mouth daily   tamsulosin (FLOMAX) 0 4 mg   Yes No      Facility-Administered Medications: None       Past Medical History:   Diagnosis Date    Asthma     Hypertension     Kidney stones     Renal disorder     frequency    Shoulder pain        Past Surgical History:   Procedure Laterality Date    COLECTOMY  2018    CYSTOSCOPY      kidney stone retrieval    JOINT REPLACEMENT Right     knee       History reviewed  No pertinent family history  I have reviewed and agree with the history as documented  E-Cigarette/Vaping     E-Cigarette/Vaping Substances     Social History     Tobacco Use    Smoking status: Never Smoker    Smokeless tobacco: Never Used   Substance Use Topics    Alcohol use: Not Currently     Frequency: Monthly or less     Comment: rarely    Drug use: Never       Review of Systems   Constitutional: Negative for chills, diaphoresis, fever and malaise/fatigue  HENT: Negative for ear pain  Eyes: Negative for pain and visual disturbance  Respiratory: Negative for cough, hemoptysis, shortness of breath and stridor  Cardiovascular: Negative for chest pain, palpitations, orthopnea, syncope and near-syncope  Gastrointestinal: Negative for abdominal pain, nausea and vomiting  Genitourinary: Negative for dysuria and hematuria  Musculoskeletal: Negative for arthralgias and back pain  Skin: Negative for color change and rash  Neurological: Negative for dizziness, seizures, speech difficulty, weakness and numbness  All other systems reviewed and are negative  Physical Exam  Physical Exam  Vitals signs and nursing note reviewed  Constitutional:       Appearance: He is well-developed  HENT:      Head: Normocephalic and atraumatic  Mouth/Throat:      Mouth: Mucous membranes are moist    Eyes:      Extraocular Movements: Extraocular movements intact        Conjunctiva/sclera: Conjunctivae normal       Pupils: Pupils are equal, round, and reactive to light  Neck:      Musculoskeletal: Neck supple  Cardiovascular:      Rate and Rhythm: Normal rate and regular rhythm  Heart sounds: No murmur  Pulmonary:      Effort: Pulmonary effort is normal  No respiratory distress  Breath sounds: Normal breath sounds  Abdominal:      Palpations: Abdomen is soft  There is no mass  Tenderness: There is no abdominal tenderness  There is no right CVA tenderness or left CVA tenderness  Musculoskeletal:      Right lower leg: No edema  Left lower leg: No edema  Skin:     General: Skin is warm and dry  Capillary Refill: Capillary refill takes less than 2 seconds  Neurological:      General: No focal deficit present  Mental Status: He is alert and oriented to person, place, and time           Vital Signs  ED Triage Vitals [06/04/21 1439]   Temperature Pulse Respirations Blood Pressure SpO2   (!) 97 1 °F (36 2 °C) 80 19 164/91 98 %      Temp Source Heart Rate Source Patient Position - Orthostatic VS BP Location FiO2 (%)   Temporal Monitor Lying Right arm --      Pain Score       No Pain           Vitals:    06/04/21 1500 06/04/21 1515 06/04/21 1530 06/04/21 1545   BP: 127/76 123/74 119/76 124/81   Pulse: 75 74 74 74   Patient Position - Orthostatic VS:             Visual Acuity      ED Medications  Medications   sodium chloride 0 9 % bolus 500 mL (0 mL Intravenous Stopped 6/4/21 1557)   apixaban (ELIQUIS) tablet 5 mg (5 mg Oral Given 6/4/21 1606)       Diagnostic Studies  Results Reviewed     Procedure Component Value Units Date/Time    Comprehensive metabolic panel [[id number]] Collected: 06/04/21 1448    Lab Status: Final result Specimen: Blood from Arm, Left Updated: 06/04/21 1516     Sodium 141 mmol/L      Potassium 4 6 mmol/L      Chloride 105 mmol/L      CO2 31 mmol/L      ANION GAP 5 mmol/L      BUN 24 mg/dL      Creatinine 1 10 mg/dL      Glucose 115 mg/dL Calcium 9 0 mg/dL      AST 26 U/L      ALT 30 U/L      Alkaline Phosphatase 64 U/L      Total Protein 7 2 g/dL      Albumin 4 2 g/dL      Total Bilirubin 0 93 mg/dL      eGFR 64 ml/min/1 73sq m     Narrative:      [place] guidelines for Chronic Kidney Disease (CKD):     Stage 1 with normal or high GFR (GFR > 90 mL/min/1 73 square meters)    Stage 2 Mild CKD (GFR = 60-89 mL/min/1 73 square meters)    Stage 3A Moderate CKD (GFR = 45-59 mL/min/1 73 square meters)    Stage 3B Moderate CKD (GFR = 30-44 mL/min/1 73 square meters)    Stage 4 Severe CKD (GFR = 15-29 mL/min/1 73 square meters)    Stage 5 End Stage CKD (GFR <15 mL/min/1 73 square meters)  Note: GFR calculation is accurate only with a steady state creatinine    Magnesium [[id number]]  (Normal) Collected: 06/04/21 1448    Lab Status: Final result Specimen: Blood from Arm, Left Updated: 06/04/21 1516     Magnesium 2 3 mg/dL     NT-BNP PRO [[id number]]  (Abnormal) Collected: 06/04/21 1448    Lab Status: Final result Specimen: Blood from Arm, Left Updated: 06/04/21 1516     NT-proBNP 1,080 pg/mL     Troponin I [[id number]]  (Normal) Collected: 06/04/21 1448    Lab Status: Final result Specimen: Blood from Arm, Left Updated: 06/04/21 1514     Troponin I <0 02 ng/mL     CBC and differential [[id number]]  (Abnormal) Collected: 06/04/21 1448    Lab Status: Final result Specimen: Blood from Arm, Left Updated: 06/04/21 1454     WBC 4 73 Thousand/uL      RBC 4 81 Million/uL      Hemoglobin 15 1 g/dL      Hematocrit 45 2 %      MCV 94 fL      MCH 31 4 pg      MCHC 33 4 g/dL      RDW 11 5 %      MPV 10 6 fL      Platelets 766 Thousands/uL      nRBC 0 /100 WBCs      Neutrophils Relative 76 %      Immat GRANS % 0 %      Lymphocytes Relative 16 %      Monocytes Relative 7 %      Eosinophils Relative 1 %      Basophils Relative 0 %      Neutrophils Absolute 3 56 Thousands/µL      Immature Grans Absolute 0 02 Thousand/uL      Lymphocytes Absolute 0 76 Thousands/µL      Monocytes Absolute 0 33 Thousand/µL      Eosinophils Absolute 0 04 Thousand/µL      Basophils Absolute 0 02 Thousands/µL                  XR chest 1 view portable   Final Result by Noelle Morrow MD (06/04 1538)      No acute cardiopulmonary disease  Workstation performed: [id number]                    Procedures  ECG 12 Lead Documentation Only    Date/Time: 6/4/2021 3:06 PM  Performed by: Sekou Ye PA-C  Authorized by: Sekou Ye PA-C     Indications / Diagnosis:  Fluttering in chest  ECG reviewed by me, the ED Provider: yes    Patient location:  ED  Previous ECG:     Previous ECG:  Unavailable    Comparison to cardiac monitor: Yes    Interpretation:     Interpretation: abnormal    Rate:     ECG rate:  76    ECG rate assessment: normal    Rhythm:     Rhythm: atrial flutter    Conduction:     Conduction: abnormal               ED Course  ED Course as of Jun 04 1606   Fri Jun 04, 2021   1514 Troponin I: <0 02   1522 Speaking with Dr Nigel Otoole with Cardiology       1600 Recommendation was to have the patient follow-up in clinic with him, also recommendation was to start Eliquis and have him continue aspirin  SBIRT 22yo+      Most Recent Value   SBIRT (24 yo +)   In order to provide better care to our patients, we are screening all of our patients for alcohol and drug use  Would it be okay to ask you these screening questions? No Filed at: 06/04/2021 1447                    MDM  Number of Diagnoses or Management Options  Atrial flutter [facility]):   Diagnosis management comments: Spoke with cardiology  Patient remained rate controlled in the ED, rate was 70-80  Recommendation from Cardiology was to have Eliquis started as well aspirin  Follow-up in the clinic  Patient expressed understanding was in agreement treatment plan         Amount and/or Complexity of Data Reviewed  Clinical lab tests: ordered and reviewed  Tests in the radiology section of CPT®: ordered and reviewed  Decide to obtain previous medical records or to obtain history from someone other than the patient: yes  Review and summarize past medical records: yes  Discuss the patient with other providers: yes  Independent visualization of images, tracings, or specimens: yes    Risk of Complications, Morbidity, and/or Mortality  Presenting problems: moderate  Diagnostic procedures: moderate  Management options: moderate    Patient Progress  Patient progress: stable      Disposition  Final diagnoses:   Atrial flutter ([address] )     Time reflects when diagnosis was documented in both MDM as applicable and the Disposition within this note     Time User Action Codes Description Comment    6/4/2021  3:49 PM Colton Romero [[id number]] Atrial flutter [facility])       ED Disposition     ED Disposition Condition Date/Time Comment    Discharge Stable Fri Jun 4, 2021  3:49 PM Whelan Wickes discharge to home/self care              Follow-up Information     Follow up With Specialties Details Why Contact Info    Deon Momin MD Cardiology Schedule an appointment as soon as possible for a visit   [address] [facility]  [address]  [phone]            Patient's Medications   Discharge Prescriptions    APIXABAN (ELIQUIS) 5 MG    Take 1 tablet (5 mg total) by mouth 2 (two) times a day       Start Date: 6/4/2021  End Date: 7/4/2021       Order Dose: 5 mg       Quantity: 60 tablet    Refills: 0    ASPIRIN 81 MG CHEWABLE TABLET    Chew 1 tablet (81 mg total) daily       Start Date: 6/4/2021  End Date: --       Order Dose: 81 mg       Quantity: 20 tablet    Refills: 0         PDMP Review     None          ED Provider  Electronically Signed by           Gerold Homans, PA-C  06/04/21 5839

## 2021-06-07 LAB
ATRIAL RATE: 308 BPM
ATRIAL RATE: 375 BPM
P AXIS: 250 DEGREES
QRS AXIS: 60 DEGREES
QRS AXIS: 63 DEGREES
QRSD INTERVAL: 74 MS
QRSD INTERVAL: 78 MS
QT INTERVAL: 350 MS
QT INTERVAL: 366 MS
QTC INTERVAL: 396 MS
QTC INTERVAL: 411 MS
T WAVE AXIS: 48 DEGREES
T WAVE AXIS: 52 DEGREES
VENTRICULAR RATE: 76 BPM
VENTRICULAR RATE: 77 BPM

## 2021-06-07 PROCEDURE — 93010 ELECTROCARDIOGRAM REPORT: CPT

## 2021-07-27 ENCOUNTER — TELEPHONE (OUTPATIENT)
Dept: NON INVASIVE DIAGNOSTICS | Facility: HOSPITAL | Age: 81
End: 2021-07-27

## 2021-07-27 ENCOUNTER — ANESTHESIA EVENT (OUTPATIENT)
Dept: NON INVASIVE DIAGNOSTICS | Facility: HOSPITAL | Age: 81
End: 2021-07-27

## 2021-07-27 PROBLEM — I48.92 ATRIAL FLUTTER (HCC): Status: ACTIVE | Noted: 2021-07-27

## 2021-07-27 PROBLEM — J45.909 ASTHMA: Status: ACTIVE | Noted: 2021-07-27

## 2021-07-27 PROBLEM — I10 HTN (HYPERTENSION): Status: ACTIVE | Noted: 2021-07-27

## 2021-07-27 NOTE — ANESTHESIA PREPROCEDURE EVALUATION
Procedure:  CARDIOVERSION (NON INVASIVE ONLY)    Relevant Problems   CARDIO  Echo 2018 EF 71% valves good   (+) Atrial flutter (HCC)   (+) HTN (hypertension)      PULMONARY   (+) Asthma             Anesthesia Plan  ASA Score- 2     Anesthesia Type- IV sedation with anesthesia with ASA Monitors  Additional Monitors:   Airway Plan:           Plan Factors-    Chart reviewed  Patient summary reviewed  Induction- intravenous  Postoperative Plan-     Informed Consent- Anesthetic plan and risks discussed with patient  I personally reviewed this patient with the CRNA  Discussed and agreed on the Anesthesia Plan with the CRNA  Jay Oorurke Pt had trouble swallowing pills so ate a piece of bread  WIll cancel

## 2021-07-27 NOTE — TELEPHONE ENCOUNTER
Spoke with patient  Reviewed the following instructions: 1)  NPO after mdingihcapo tonight  Please take Eliquis dose in Am 7/28 with sip of water  2)  Please bring a  with you because you may not drive after receiving anesthesia and 3)  Arrival time is 7am  Please bring your photo ID, insurance card and list of medicines with you  Patient stated verbal understanding of these instuctions

## 2021-07-28 ENCOUNTER — HOSPITAL ENCOUNTER (OUTPATIENT)
Dept: NON INVASIVE DIAGNOSTICS | Facility: HOSPITAL | Age: 81
Discharge: HOME | End: 2021-07-28
Attending: INTERNAL MEDICINE
Payer: COMMERCIAL

## 2021-07-28 ENCOUNTER — ANESTHESIA (OUTPATIENT)
Dept: NON INVASIVE DIAGNOSTICS | Facility: HOSPITAL | Age: 81
End: 2021-07-28

## 2021-07-28 VITALS
RESPIRATION RATE: 20 BRPM | TEMPERATURE: 97.9 F | DIASTOLIC BLOOD PRESSURE: 83 MMHG | OXYGEN SATURATION: 98 % | HEART RATE: 83 BPM | SYSTOLIC BLOOD PRESSURE: 143 MMHG

## 2021-07-28 DIAGNOSIS — I48.91 ATRIAL FIBRILLATION, UNSPECIFIED TYPE (HCC): ICD-10-CM

## 2021-07-28 PROCEDURE — 92960 CARDIOVERSION ELECTRIC EXT: CPT

## 2021-07-28 NOTE — NURSING NOTE
Cardiology nurses made aware of cancelled case, dr Mauricio Cornelius to see patient when he arrives  Cardiology nurses to reschedule patient

## 2021-07-28 NOTE — PROGRESS NOTES
Patient here for CV and needed to be changed to another date/time  Spoke with patient and confirmed with him and his wife that we would do the procedure on Aug 11, 2021 at [address] [facility] him to take his eliquis at midnight but not AM of test due to his swallowing issues  Isabel and wife agreeable to this plan

## 2021-07-28 NOTE — H&P
Addendum 7/28/21:    Pt ate bread this morning to take morning pills  Anesthesia canceled case  Will plan to reschedule case with eliquis taken at midnight  Ref Range & Units 7 d ago Comments   BUN 6 - 20 mg/dL 21High           Creatinine 0 6 - 1 2 mg/dL 1 0          Estimated Glomerular Filtration Rate >=60 0 mL/min 75 4  If patient is , multiply estimated GFR by 1 159  Sodium 135 - 146 mmol/L 144          Potassium 3 5 - 5 1 mmol/L 5 0          Chloride 98 - 107 mmol/L 107          CO2 22 - 32 mmol/L 29          Anion Gap 7 - 15 mmol/L 8          Glucose 70 - 120 mg/dL 125High           Albumin 3 8 - 5 0 g/dL 4 6          AST 10 - 50 U/L 22          Alkaline Phosphatase 35 - 130 U/L 58          Bilirubin, Total <=1 2 mg/dL 0 8          Calcium 8 4 - 10 2 mg/dL 10 0          Protein 6 0 - 8 3 g/dL 6 6          ALT 10 - 50 U/L 24          Resulting Agency  [facility]       Ref Range & Units 7 d ago   WBC 4 00 - 10 80 K/uL 4 20        RBC 4 50 - 5 25 M/uL 4 68        HGB 14 0 - 16 8 g/dL 14 9        HCT 40 0 - 48 4 % 46 0        MCV 82 0 - 99 5 fL 98 3        MCH 27 0 - 34 0 pg 31 8        MCHC 32 0 - 36 0 g/dL 32 4        RDW 11 5 - 15 5 % 11 9        MPV 6 6 - 11 1 fL 11 5High         Nucleated RBC <=0 /100 WBCs 0        Plt 140 - 400 K/uL 151          Cardiology Outpatient Consultation Refugio Bailey is a 68year old male referred by Aaron Morse MD who is seen in consultation for pre-op evaluation and atrial fibrillation  The referring physician has requested evaluation  HPI:  Here for preop evaluation prior to urolift  Had routine preop ECG- found to be in AFl with controlled response  He has no sxs, no SOB, VU, CP       Started on apixaban, continued BB    Has hx of AF at time of sepsis- resolved, seen by [facility] cardio at time    No hx of CAD  Good ex lb, can go 1 FOS w/o CP    PMH  HTN   BPH  Colon resection  Hernia repair      Social History     Tobacco Use Smoking status: Never Smoker    Smokeless tobacco: Never Used   Substance Use Topics    Alcohol use: Never   Frequency: Never   Binge frequency: Never    Drug use: Never     Vaping/E-Cigarette Use    Vaping/E-Cigarette Use Never User     Vaping/E-Cigarette Substances     Vaping/E-Cigarette Devices     Review of patient's allergies indicates:  No Known Allergies    Current Outpatient Medications   Medication Sig Dispense Refill    Apixaban 5 MG Oral Tablet (Eliquis) Take 5 mg by mouth daily  Budesonide-Formoterol Fumarate 160-4 5 MCG/ACT Inhalation Aerosol (Symbicort) Inhale 1 Puff by mouth daily  Calcium Carbonate 1250 (500 Ca) MG Oral Tablet Take 500 mg by mouth daily  Docusate Sodium 100 MG Oral Capsule (Colace) Take 100 mg by mouth 2 times a day  Famotidine 20 MG Oral Tablet (Pepcid) Take 20 mg by mouth daily  Ferrous Sulfate 325 (65 Fe) MG Oral Tablet (Feosol) Take 65 mg by mouth daily  Melatonin 5 MG Oral Tablet Take 5 mg by mouth at bedtime  Metoprolol Tartrate 25 MG Oral Tablet (Lopressor) Take 12 5 mg by mouth 2 times a day  Mometasone Furoate 50 MCG/ACT Nasal Suspension Administer 1 Spray into nostril daily  Montelukast Sodium 10 MG Oral Tablet (Singulair) Take 10 mg by mouth at bedtime  Omega 3 1000 MG Oral Capsule Take 1,200 mg by mouth  Rosuvastatin Calcium 10 MG Oral Tablet (Crestor) Take 10 mg by mouth daily  Tamsulosin HCl 0 4 MG Oral Capsule (Flomax) Take 0 8 mg by mouth at bedtime  Triamcinolone Acetonide 55 MCG/ACT Nasal Aerosol (Nasacort Allergy 24HR) Administer 1 Spray into nostril daily  Vitamin C 100 MG Oral Tablet Chewable Take 50 mg by mouth daily  Vitamin E 400 UNIT Oral Capsule (Aquasol E) Take 400 Units by mouth every other day       ROS:  Neg x HPI        PHYSICAL EXAMINATION  /68  Pulse 74  Temp 36 4 °C (97 5 °F)  Resp 16  Ht 1 791 m (5' 10 5")  Wt 77 6 kg (171 lb)  SpO2 96%  BMI 24 19 kg/m²  BSA 1 96 m²   Body mass index is 24 19 kg/m²  Constitutional: no acute distress  Neck: supple, normal range of motion  CV: Irrregular rhythm  Chest: normal respiratory effort, lungs clear to auscultation and percussion  Abdomen: normal: soft, bowel sounds normal, no masses, tenderness or organomegaly  Extremities: no clubbing, cyanosis, or edema, otherwise grossly normal, warm, and dry    Laboratory Data Review:  Limited POC echo today  Normal LVEF  Normal RV  No sig VHD    Impression:  1  Preop for urologic procedure  2  New onset AFlutter, controlled VR  3  [id number] >2, on Apixaban 5 BID  4  Normal LVEF  5  HTN    Plan:  1  He is ok for his surgical procedure w/o further preoperative testing  He has good exercise tolerance with >4 METs achieved w/o CP  2  He should hold his apixaban 4 doses (2 days) prior to OR  Restart at same dose when ok with urology  3  Will continue apixaban for 3 weeks then plan [facility] if still in AFl (tent after 7/20 at [facility])  4   Continue BB for HR control, may need to titrate perioperatively    Rin Hernandez MD  Cardiology [facility], [address] [place] [address] level, [address] [facility]  Phone: [phone]  Fax: [phone]  6/7/2021

## 2021-07-28 NOTE — NURSING NOTE
anestheasia in to see patient  Patient stated he has been having difficulty swallowing recently, states he may be in the early stages of parkinsons  Stated he feel his pills are stuck, he ate a piece of bread this am to make them go down along with some water  Dr Shania Perez cancelled case

## 2021-08-09 ENCOUNTER — TELEPHONE (OUTPATIENT)
Dept: NON INVASIVE DIAGNOSTICS | Facility: HOSPITAL | Age: 81
End: 2021-08-09

## 2021-08-09 NOTE — TELEPHONE ENCOUNTER
Spoke with wife as patient was unavailable  Reviewed the following instructions with her : 1)  NPO after midnight 2)  Patient is to take his dose of Eliquis around 11:45pm on 8/10/21  Patient should not take any meds in the AM of 8/11/21   3)  Arrival time is 7am for 8am procedure time  4)  Patient will need a  since he is receiving anesthesia and 5)  Bring with you your drivers license, insurance card and list of medicines  Wife stated verbal understanding of these instructions

## 2021-08-10 ENCOUNTER — ANESTHESIA EVENT (OUTPATIENT)
Dept: NON INVASIVE DIAGNOSTICS | Facility: HOSPITAL | Age: 81
End: 2021-08-10

## 2021-08-11 ENCOUNTER — HOSPITAL ENCOUNTER (OUTPATIENT)
Dept: NON INVASIVE DIAGNOSTICS | Facility: HOSPITAL | Age: 81
Discharge: HOME | End: 2021-08-11
Attending: INTERNAL MEDICINE
Payer: COMMERCIAL

## 2021-08-11 ENCOUNTER — ANESTHESIA (OUTPATIENT)
Dept: NON INVASIVE DIAGNOSTICS | Facility: HOSPITAL | Age: 81
End: 2021-08-11

## 2021-08-11 VITALS
HEIGHT: 70 IN | HEART RATE: 63 BPM | DIASTOLIC BLOOD PRESSURE: 67 MMHG | SYSTOLIC BLOOD PRESSURE: 107 MMHG | RESPIRATION RATE: 18 BRPM | TEMPERATURE: 97.8 F | OXYGEN SATURATION: 99 % | WEIGHT: 170 LBS | BODY MASS INDEX: 24.34 KG/M2

## 2021-08-11 DIAGNOSIS — I48.91 ATRIAL FIBRILLATION, UNSPECIFIED TYPE (HCC): ICD-10-CM

## 2021-08-11 PROBLEM — N20.0 NEPHROLITHIASIS: Status: ACTIVE | Noted: 2021-08-11

## 2021-08-11 PROBLEM — N40.0 BPH (BENIGN PROSTATIC HYPERPLASIA): Status: ACTIVE | Noted: 2021-08-11

## 2021-08-11 PROCEDURE — 92960 CARDIOVERSION ELECTRIC EXT: CPT

## 2021-08-11 PROCEDURE — 93005 ELECTROCARDIOGRAM TRACING: CPT

## 2021-08-11 RX ORDER — SODIUM CHLORIDE, SODIUM LACTATE, POTASSIUM CHLORIDE, CALCIUM CHLORIDE 600; 310; 30; 20 MG/100ML; MG/100ML; MG/100ML; MG/100ML
INJECTION, SOLUTION INTRAVENOUS CONTINUOUS PRN
Status: DISCONTINUED | OUTPATIENT
Start: 2021-08-11 | End: 2021-08-11

## 2021-08-11 RX ORDER — PROPOFOL 10 MG/ML
INJECTION, EMULSION INTRAVENOUS AS NEEDED
Status: DISCONTINUED | OUTPATIENT
Start: 2021-08-11 | End: 2021-08-11

## 2021-08-11 RX ORDER — LIDOCAINE HYDROCHLORIDE 10 MG/ML
INJECTION, SOLUTION EPIDURAL; INFILTRATION; INTRACAUDAL; PERINEURAL AS NEEDED
Status: DISCONTINUED | OUTPATIENT
Start: 2021-08-11 | End: 2021-08-11

## 2021-08-11 RX ADMIN — SODIUM CHLORIDE, SODIUM LACTATE, POTASSIUM CHLORIDE, AND CALCIUM CHLORIDE: .6; .31; .03; .02 INJECTION, SOLUTION INTRAVENOUS at 08:12

## 2021-08-11 RX ADMIN — PROPOFOL 60 MG: 10 INJECTION, EMULSION INTRAVENOUS at 08:15

## 2021-08-11 RX ADMIN — LIDOCAINE HYDROCHLORIDE 50 MG: 10 INJECTION, SOLUTION EPIDURAL; INFILTRATION; INTRACAUDAL; PERINEURAL at 08:15

## 2021-08-11 NOTE — ANESTHESIA POSTPROCEDURE EVALUATION
Post-Op Assessment Note    CV Status:  Stable  Pain Score: 0    Pain management: adequate     Mental Status:  Alert and awake   Hydration Status:  Euvolemic   PONV Controlled:  Controlled   Airway Patency:  Patent      Post Op Vitals Reviewed: Yes      Staff: CRNA         No complications documented      /80 (08/11/21 0813)    Temp      Pulse 84 (08/11/21 0813)   Resp 22 (08/11/21 0813)    SpO2 99 % (08/11/21 0813)

## 2021-08-11 NOTE — ANESTHESIA PREPROCEDURE EVALUATION
Procedure:  CARDIOVERSION (NON INVASIVE ONLY)    Relevant Problems   CARDIO   (+) Atrial flutter (HCC)   (+) HTN (hypertension)      /RENAL   (+) BPH (benign prostatic hyperplasia)   (+) Nephrolithiasis      PULMONARY   (+) Asthma        Physical Exam    Airway    Mallampati score: II  TM Distance: >3 FB  Neck ROM: full     Dental   No notable dental hx     Cardiovascular      Pulmonary      Other Findings        Anesthesia Plan  ASA Score- 3     Anesthesia Type- IV sedation with anesthesia with ASA Monitors  Additional Monitors:   Airway Plan:           Plan Factors-Exercise tolerance (METS): >4 METS  Chart reviewed  EKG reviewed  Existing labs reviewed  Patient summary reviewed  Patient is not a current smoker  Induction-     Postoperative Plan-     Informed Consent- Anesthetic plan and risks discussed with patient  I personally reviewed this patient with the CRNA  Discussed and agreed on the Anesthesia Plan with the CRNA  Rosella Goldmann

## 2021-08-11 NOTE — H&P
Interval H and P:  Pt reports no change in his medical history since Dr Ezequiel Recinos saw him last  He reports no chest pain, sob or VU  He has no palpitations  He took his eliquis last night at 1130 secondary to swallowing issues  He has no complaints today  Vitals are stable today and have been reviewed  Vitals:    08/11/21 0730   BP: 139/82   Pulse: 90   Resp: 18   Temp: 97 8 °F (36 6 °C)   SpO2: 99%       He is no acute distress  Neck supple  No JVD  Heart sounds irregular no m/g/r  Lungs are clear without w/r/r  Extremities without edema  Cap refill <2 seconds  Pre operative EKG confirms atrial flutter  Pt is agreeable to proceeding with DCCV today  Pre op labs noted in chart and below  Ref Range & Units 7/21/21 1053   WBC 4 00 - 10 80 K/uL 4 20        RBC 4 50 - 5 25 M/uL 4 68        HGB 14 0 - 16 8 g/dL 14 9        HCT 40 0 - 48 4 % 46 0        MCV 82 0 - 99 5 fL 98 3        MCH 27 0 - 34 0 pg 31 8        MCHC 32 0 - 36 0 g/dL 32 4        RDW 11 5 - 15 5 % 11 9        MPV 6 6 - 11 1 fL 11 5High         Nucleated RBC <=0 /100 WBCs 0        Plt 140 - 400 K/uL 151        Specimen Collected: 07/21/21 10:53 Last Resulted: 07/22/21 02:14   Received From: [facility]  Result Received: 07/22/21      BUN 6 - 20 mg/dL 21High         Creatinine 0 6 - 1 2 mg/dL 1 0        Estimated Glomerular Filtration Rate >=60 0 mL/min 75 4    Comment: If patient is , multiply estimated GFR by 1 159         Sodium 135 - 146 mmol/L 144        Potassium 3 5 - 5 1 mmol/L 5 0        Chloride 98 - 107 mmol/L 107        CO2 22 - 32 mmol/L 29        Anion Gap 7 - 15 mmol/L 8        Glucose 70 - 120 mg/dL 125High         Albumin 3 8 - 5 0 g/dL 4 6        AST 10 - 50 U/L 22        Alkaline Phosphatase 35 - 130 U/L 58        Bilirubin, Total <=1 2 mg/dL 0 8        Calcium 8 4 - 10 2 mg/dL 10 0        Protein 6 0 - 8 3 g/dL 6 6        ALT 10 - 50 U/L [facility]        Cardiology Outpatient Consultation Bret Piper is a 68year old male referred by Mayra Biswas MD who is seen in consultation for pre-op evaluation and atrial fibrillation  The referring physician has requested evaluation  HPI:  Here for preop evaluation prior to urolift  Had routine preop ECG- found to be in AFl with controlled response  He has no sxs, no SOB, VU, CP  Started on apixaban, continued BB    Has hx of AF at time of sepsis- resolved, seen by [employer] [facility] at time    No hx of CAD  Good ex lb, can go 1 FOS w/o CP    PMH  HTN   BPH  Colon resection  Hernia repair      Social History     Tobacco Use    Smoking status: Never Smoker    Smokeless tobacco: Never Used   Substance Use Topics    Alcohol use: Never   Frequency: Never   Binge frequency: Never    Drug use: Never     Vaping/E-Cigarette Use    Vaping/E-Cigarette Use Never User     Vaping/E-Cigarette Substances     Vaping/E-Cigarette Devices     Review of patient's allergies indicates:  No Known Allergies    Current Outpatient Medications   Medication Sig Dispense Refill    Apixaban 5 MG Oral Tablet (Eliquis) Take 5 mg by mouth daily   Budesonide-Formoterol Fumarate 160-4 5 MCG/ACT Inhalation Aerosol (Symbicort) Inhale 1 Puff by mouth daily   Calcium Carbonate 1250 (500 Ca) MG Oral Tablet Take 500 mg by mouth daily   Docusate Sodium 100 MG Oral Capsule (Colace) Take 100 mg by mouth 2 times a day   Famotidine 20 MG Oral Tablet (Pepcid) Take 20 mg by mouth daily   Ferrous Sulfate 325 (65 Fe) MG Oral Tablet (Feosol) Take 65 mg by mouth daily   Melatonin 5 MG Oral Tablet Take 5 mg by mouth at bedtime   Metoprolol Tartrate 25 MG Oral Tablet (Lopressor) Take 12 5 mg by mouth 2 times a day   Mometasone Furoate 50 MCG/ACT Nasal Suspension Administer 1 Spray into nostril daily   Montelukast Sodium 10 MG Oral Tablet (Singulair) Take 10 mg by mouth at bedtime   Omega 3 1000 MG Oral Capsule Take 1,200 mg by mouth      Rosuvastatin Calcium 10 MG Oral Tablet (Crestor) Take 10 mg by mouth daily   Tamsulosin HCl 0 4 MG Oral Capsule (Flomax) Take 0 8 mg by mouth at bedtime   Triamcinolone Acetonide 55 MCG/ACT Nasal Aerosol (Nasacort Allergy 24HR) Administer 1 Spray into nostril daily   Vitamin C 100 MG Oral Tablet Chewable Take 50 mg by mouth daily   Vitamin E 400 UNIT Oral Capsule (Aquasol E) Take 400 Units by mouth every other day  ROS:  Neg x HPI        PHYSICAL EXAMINATION  /68  Pulse 74  Temp 36 4 °C (97 5 °F)  Resp 16  Ht 1 791 m (5' 10 5")  Wt 77 6 kg (171 lb)  SpO2 96%  BMI 24 19 kg/m²  BSA 1 96 m²   Body mass index is 24 19 kg/m²  Constitutional: no acute distress  Neck: supple, normal range of motion  CV: Irrregular rhythm  Chest: normal respiratory effort, lungs clear to auscultation and percussion  Abdomen: normal: soft, bowel sounds normal, no masses, tenderness or organomegaly  Extremities: no clubbing, cyanosis, or edema, otherwise grossly normal, warm, and dry    Laboratory Data Review:  Limited POC echo today  Normal LVEF  Normal RV  No sig VHD    Impression:  1  Preop for urologic procedure  2  New onset AFlutter, controlled VR  3  [id number] >2, on Apixaban 5 BID  4  Normal LVEF  5  HTN    Plan:  1  He is ok for his surgical procedure w/o further preoperative testing  He has good exercise tolerance with >4 METs achieved w/o CP  2  He should hold his apixaban 4 doses (2 days) prior to OR  Restart at same dose when ok with urology  3  Will continue apixaban for 3 weeks then plan [facility] if still in AFl (tent after 7/20 at [address] [place] [address])  4   Continue BB for HR control, may need to titrate perioperatively    Magalys Courtney MD  Cardiology [address] [facility], [address] [facility], [address] [facility]  Phone: [phone]  Fax: [phone]  6/7/2021    Electronically signed by Dolly Zhang MD at 06/07/2021 2:58 PM EDT

## 2021-08-23 LAB
ATRIAL RATE: 250 BPM
ATRIAL RATE: 61 BPM
P AXIS: 80 DEGREES
PR INTERVAL: 164 MS
QRS AXIS: 73 DEGREES
QRS AXIS: 73 DEGREES
QRSD INTERVAL: 80 MS
QRSD INTERVAL: 82 MS
QT INTERVAL: 350 MS
QT INTERVAL: 408 MS
QTC INTERVAL: 410 MS
QTC INTERVAL: 437 MS
T WAVE AXIS: 53 DEGREES
T WAVE AXIS: 58 DEGREES
VENTRICULAR RATE: 61 BPM
VENTRICULAR RATE: 94 BPM

## 2022-09-18 ENCOUNTER — APPOINTMENT (EMERGENCY)
Dept: CT IMAGING | Facility: HOSPITAL | Age: 82
End: 2022-09-18
Payer: COMMERCIAL

## 2022-09-18 ENCOUNTER — APPOINTMENT (OUTPATIENT)
Dept: RADIOLOGY | Facility: HOSPITAL | Age: 82
End: 2022-09-18
Payer: COMMERCIAL

## 2022-09-18 ENCOUNTER — HOSPITAL ENCOUNTER (EMERGENCY)
Facility: HOSPITAL | Age: 82
Discharge: ANOTHER HOSPITAL | End: 2022-09-18
Attending: EMERGENCY MEDICINE | Admitting: EMERGENCY MEDICINE
Payer: COMMERCIAL

## 2022-09-18 VITALS
OXYGEN SATURATION: 93 % | TEMPERATURE: 98.4 F | WEIGHT: 165 LBS | DIASTOLIC BLOOD PRESSURE: 88 MMHG | RESPIRATION RATE: 33 BRPM | HEART RATE: 62 BPM | BODY MASS INDEX: 23.62 KG/M2 | HEIGHT: 70 IN | SYSTOLIC BLOOD PRESSURE: 175 MMHG

## 2022-09-18 DIAGNOSIS — S22.31XA CLOSED FRACTURE OF ONE RIB OF RIGHT SIDE, INITIAL ENCOUNTER: ICD-10-CM

## 2022-09-18 DIAGNOSIS — S27.0XXA TRAUMATIC PNEUMOTHORAX, INITIAL ENCOUNTER: Primary | ICD-10-CM

## 2022-09-18 LAB
ALBUMIN SERPL BCP-MCNC: 4.2 G/DL (ref 3.5–5)
ALP SERPL-CCNC: 58 U/L (ref 46–116)
ALT SERPL W P-5'-P-CCNC: 14 U/L (ref 12–78)
ANION GAP SERPL CALCULATED.3IONS-SCNC: 8 MMOL/L (ref 4–13)
APTT PPP: 30 SECONDS (ref 23–37)
AST SERPL W P-5'-P-CCNC: 22 U/L (ref 5–45)
BASOPHILS # BLD AUTO: 0.02 THOUSANDS/ΜL (ref 0–0.1)
BASOPHILS NFR BLD AUTO: 0 % (ref 0–1)
BILIRUB SERPL-MCNC: 0.81 MG/DL (ref 0.2–1)
BUN SERPL-MCNC: 21 MG/DL (ref 5–25)
CALCIUM SERPL-MCNC: 9.2 MG/DL (ref 8.3–10.1)
CHLORIDE SERPL-SCNC: 106 MMOL/L (ref 96–108)
CO2 SERPL-SCNC: 31 MMOL/L (ref 21–32)
CREAT SERPL-MCNC: 0.87 MG/DL (ref 0.6–1.3)
EOSINOPHIL # BLD AUTO: 0.03 THOUSAND/ΜL (ref 0–0.61)
EOSINOPHIL NFR BLD AUTO: 1 % (ref 0–6)
ERYTHROCYTE [DISTWIDTH] IN BLOOD BY AUTOMATED COUNT: 11.6 % (ref 11.6–15.1)
GFR SERPL CREATININE-BSD FRML MDRD: 82 ML/MIN/1.73SQ M
GLUCOSE SERPL-MCNC: 109 MG/DL (ref 65–140)
HCT VFR BLD AUTO: 42.5 % (ref 36.5–49.3)
HGB BLD-MCNC: 14.2 G/DL (ref 12–17)
IMM GRANULOCYTES # BLD AUTO: 0.01 THOUSAND/UL (ref 0–0.2)
IMM GRANULOCYTES NFR BLD AUTO: 0 % (ref 0–2)
INR PPP: 1.08 (ref 0.84–1.19)
LYMPHOCYTES # BLD AUTO: 0.66 THOUSANDS/ΜL (ref 0.6–4.47)
LYMPHOCYTES NFR BLD AUTO: 12 % (ref 14–44)
MCH RBC QN AUTO: 31.4 PG (ref 26.8–34.3)
MCHC RBC AUTO-ENTMCNC: 33.4 G/DL (ref 31.4–37.4)
MCV RBC AUTO: 94 FL (ref 82–98)
MONOCYTES # BLD AUTO: 0.5 THOUSAND/ΜL (ref 0.17–1.22)
MONOCYTES NFR BLD AUTO: 9 % (ref 4–12)
NEUTROPHILS # BLD AUTO: 4.18 THOUSANDS/ΜL (ref 1.85–7.62)
NEUTS SEG NFR BLD AUTO: 78 % (ref 43–75)
NRBC BLD AUTO-RTO: 0 /100 WBCS
PLATELET # BLD AUTO: 151 THOUSANDS/UL (ref 149–390)
PMV BLD AUTO: 10.6 FL (ref 8.9–12.7)
POTASSIUM SERPL-SCNC: 4.8 MMOL/L (ref 3.5–5.3)
PROT SERPL-MCNC: 7.2 G/DL (ref 6.4–8.4)
PROTHROMBIN TIME: 14.2 SECONDS (ref 11.6–14.5)
RBC # BLD AUTO: 4.52 MILLION/UL (ref 3.88–5.62)
SODIUM SERPL-SCNC: 145 MMOL/L (ref 135–147)
WBC # BLD AUTO: 5.4 THOUSAND/UL (ref 4.31–10.16)

## 2022-09-18 PROCEDURE — 99291 CRITICAL CARE FIRST HOUR: CPT | Performed by: EMERGENCY MEDICINE

## 2022-09-18 PROCEDURE — 32556 INSERT CATH PLEURA W/O IMAGE: CPT | Performed by: EMERGENCY MEDICINE

## 2022-09-18 PROCEDURE — 74176 CT ABD & PELVIS W/O CONTRAST: CPT

## 2022-09-18 PROCEDURE — 96375 TX/PRO/DX INJ NEW DRUG ADDON: CPT

## 2022-09-18 PROCEDURE — 71250 CT THORAX DX C-: CPT

## 2022-09-18 PROCEDURE — G1004 CDSM NDSC: HCPCS

## 2022-09-18 PROCEDURE — 85025 COMPLETE CBC W/AUTO DIFF WBC: CPT | Performed by: EMERGENCY MEDICINE

## 2022-09-18 PROCEDURE — 80053 COMPREHEN METABOLIC PANEL: CPT | Performed by: EMERGENCY MEDICINE

## 2022-09-18 PROCEDURE — 85730 THROMBOPLASTIN TIME PARTIAL: CPT | Performed by: EMERGENCY MEDICINE

## 2022-09-18 PROCEDURE — 99285 EMERGENCY DEPT VISIT HI MDM: CPT

## 2022-09-18 PROCEDURE — 36415 COLL VENOUS BLD VENIPUNCTURE: CPT | Performed by: EMERGENCY MEDICINE

## 2022-09-18 PROCEDURE — 85610 PROTHROMBIN TIME: CPT | Performed by: EMERGENCY MEDICINE

## 2022-09-18 PROCEDURE — 71045 X-RAY EXAM CHEST 1 VIEW: CPT

## 2022-09-18 PROCEDURE — 70450 CT HEAD/BRAIN W/O DYE: CPT

## 2022-09-18 PROCEDURE — 96374 THER/PROPH/DIAG INJ IV PUSH: CPT

## 2022-09-18 RX ORDER — FENTANYL CITRATE 50 UG/ML
50 INJECTION, SOLUTION INTRAMUSCULAR; INTRAVENOUS ONCE
Status: COMPLETED | OUTPATIENT
Start: 2022-09-18 | End: 2022-09-18

## 2022-09-18 RX ORDER — KETAMINE HCL IN NACL, ISO-OSM 100MG/10ML
1 SYRINGE (ML) INJECTION ONCE
Status: COMPLETED | OUTPATIENT
Start: 2022-09-18 | End: 2022-09-18

## 2022-09-18 RX ADMIN — FENTANYL CITRATE 50 MCG: 50 INJECTION INTRAMUSCULAR; INTRAVENOUS at 17:40

## 2022-09-18 RX ADMIN — Medication 75 MG: at 16:18

## 2022-09-18 NOTE — ED PROVIDER NOTES
History  Chief Complaint   Patient presents with    Rib Pain     Pt playing softball while running bases falling forward into the base twisting to his right side 1hr ago-fell twice on same side  Pt c/o right side rib pain radiating to back -pain upon inhalation, and abrasions to right knee  Denies loc/headstrike  Pt taking eliquis-hx afib     Patient is on Eliquis for atrial fibrillation  Patient states that twice a day while playing softball he was running and fell forward and landed on his right side  Landed on the ground  Complaining of pain on the right side of his chest   No shortness of breath  No nausea or vomiting  No headaches  No neck pain  Is on Eliquis  Nothing taken for pain prior to arrival       History provided by:  Patient   used: No    Fall  Mechanism of injury: fall    Injury location: Right chest   Incident location:  Outdoors  Time since incident: This afternoon    Fall:     Fall occurred:  Running    Impact surface:  Grass    Point of impact: Right chest   Protective equipment: none    Suspicion of alcohol use: no    Suspicion of drug use: no    Prior to arrival data:     Bystander interventions:  None    Patient ambulatory at scene: yes      Blood loss:  None    Responsiveness at scene:  Alert    Orientation at scene:  Person, place, situation and time    Loss of consciousness: no      Amnesic to event: no      Airway interventions:  None    Breathing interventions:  None    IV access status:  None    IO access:  None    Fluids administered:  None    Cardiac interventions:  None    Medications administered:  None    Immobilization:  None    Airway condition since incident:  Stable    Breathing condition since incident:  Stable    Circulation condition since incident:  Stable    Mental status condition since incident:  Stable    Disability condition since incident:  Stable  Associated symptoms: chest pain    Associated symptoms: no abdominal pain, no headaches, no hearing loss, no nausea, no neck pain, no seizures and no vomiting    Risk factors: anticoagulation therapy        Prior to Admission Medications   Prescriptions Last Dose Informant Patient Reported? Taking? Ascorbic Acid (VITAMIN C) 100 MG tablet   Yes No   Sig: Take 100 mg by mouth daily   Naproxen Sodium (ALEVE PO)   Yes No   Sig: Take by mouth   UNKNOWN TO PATIENT   Yes No   apixaban (ELIQUIS) 5 mg   No No   Sig: Take 1 tablet (5 mg total) by mouth 2 (two) times a day   aspirin 81 mg chewable tablet   No No   Sig: Chew 1 tablet (81 mg total) daily   calcium acetate (PHOSLO) 667 mg capsule   Yes No   Sig: Take 1,334 mg by mouth 3 (three) times a day with meals   diclofenac sodium (VOLTAREN) 1 %   Yes No   metoprolol tartrate (LOPRESSOR) 25 mg tablet   Yes No   Sig: Take 12 5 mg by mouth 2 (two) times a day   metroNIDAZOLE (METROGEL) 0 75 % gel   Yes No   Sig: Apply 1 application topically 2 (two) times a day To affected area   montelukast (SINGULAIR) 10 mg tablet   Yes No   rosuvastatin (CRESTOR) 10 MG tablet   Yes No   Sig: Take 10 mg by mouth daily   tamsulosin (FLOMAX) 0 4 mg   Yes No      Facility-Administered Medications: None       Past Medical History:   Diagnosis Date    Asthma     Hypertension     Irregular heart beat     Kidney stones     Renal disorder     frequency    Shoulder pain        Past Surgical History:   Procedure Laterality Date    COLECTOMY  2018    CYSTOSCOPY      kidney stone retrieval    JOINT REPLACEMENT Right     knee       History reviewed  No pertinent family history  I have reviewed and agree with the history as documented      E-Cigarette/Vaping    E-Cigarette Use Never User      E-Cigarette/Vaping Substances     Social History     Tobacco Use    Smoking status: Never Smoker    Smokeless tobacco: Never Used   Vaping Use    Vaping Use: Never used   Substance Use Topics    Alcohol use: Not Currently     Comment: rarely    Drug use: Never       Review of Systems Constitutional: Negative for chills and fever  HENT: Negative for ear pain, hearing loss, sore throat, trouble swallowing and voice change  Eyes: Negative for pain and discharge  Respiratory: Negative for cough, shortness of breath and wheezing  Cardiovascular: Positive for chest pain  Negative for palpitations  Gastrointestinal: Negative for abdominal pain, blood in stool, constipation, diarrhea, nausea and vomiting  Genitourinary: Negative for dysuria, flank pain, frequency and hematuria  Musculoskeletal: Negative for joint swelling, neck pain and neck stiffness  Skin: Negative for rash and wound  Neurological: Negative for dizziness, seizures, syncope, facial asymmetry and headaches  Psychiatric/Behavioral: Negative for hallucinations, self-injury and suicidal ideas  All other systems reviewed and are negative  Physical Exam  Physical Exam  Vitals and nursing note reviewed  Constitutional:       General: He is not in acute distress  Appearance: He is well-developed  HENT:      Head: Normocephalic and atraumatic  Right Ear: External ear normal       Left Ear: External ear normal    Eyes:      General: No scleral icterus  Right eye: No discharge  Left eye: No discharge  Extraocular Movements: Extraocular movements intact  Conjunctiva/sclera: Conjunctivae normal    Cardiovascular:      Rate and Rhythm: Normal rate and regular rhythm  Heart sounds: Normal heart sounds  No murmur heard  Pulmonary:      Effort: Pulmonary effort is normal       Breath sounds: Normal breath sounds  No wheezing or rales  Comments: Right chest wall is minimally tender to palpation  No erythema  No crepitus  No bony deformity  Abdominal:      General: Bowel sounds are normal  There is no distension  Palpations: Abdomen is soft  Tenderness: There is no abdominal tenderness  There is no guarding or rebound     Musculoskeletal:         General: No deformity  Normal range of motion  Cervical back: Normal range of motion and neck supple  Skin:     General: Skin is warm and dry  Findings: No rash  Neurological:      General: No focal deficit present  Mental Status: He is alert and oriented to person, place, and time  Cranial Nerves: No cranial nerve deficit  Psychiatric:         Mood and Affect: Mood normal          Behavior: Behavior normal          Thought Content:  Thought content normal          Judgment: Judgment normal          Vital Signs  ED Triage Vitals [09/18/22 1443]   Temperature Pulse Respirations Blood Pressure SpO2   98 4 °F (36 9 °C) 85 18 162/82 97 %      Temp Source Heart Rate Source Patient Position - Orthostatic VS BP Location FiO2 (%)   Temporal Monitor Sitting Right arm --      Pain Score       4           Vitals:    09/18/22 1644 09/18/22 1645 09/18/22 1646 09/18/22 1647   BP:   (!) 211/95    Pulse: 64 62 63 60   Patient Position - Orthostatic VS:             Visual Acuity      ED Medications  Medications   Ketamine HCl 75 mg (75 mg Intravenous Given 9/18/22 1618)       Diagnostic Studies  Results Reviewed     Procedure Component Value Units Date/Time    Comprehensive metabolic panel [[id number]] Collected: 09/18/22 1609    Lab Status: Final result Specimen: Blood from Arm, Left Updated: 09/18/22 1638     Sodium 145 mmol/L      Potassium 4 8 mmol/L      Chloride 106 mmol/L      CO2 31 mmol/L      ANION GAP 8 mmol/L      BUN 21 mg/dL      Creatinine 0 87 mg/dL      Glucose 109 mg/dL      Calcium 9 2 mg/dL      AST 22 U/L      ALT 14 U/L      Alkaline Phosphatase 58 U/L      Total Protein 7 2 g/dL      Albumin 4 2 g/dL      Total Bilirubin 0 81 mg/dL      eGFR 82 ml/min/1 73sq m     Narrative:      [place] guidelines for Chronic Kidney Disease (CKD):     Stage 1 with normal or high GFR (GFR > 90 mL/min/1 73 square meters)    Stage 2 Mild CKD (GFR = 60-89 mL/min/1 73 square meters)   Stage 3A Moderate CKD (GFR = 45-59 mL/min/1 73 square meters)    Stage 3B Moderate CKD (GFR = 30-44 mL/min/1 73 square meters)    Stage 4 Severe CKD (GFR = 15-29 mL/min/1 73 square meters)    Stage 5 End Stage CKD (GFR <15 mL/min/1 73 square meters)  Note: GFR calculation is accurate only with a steady state creatinine    Protime-INR [[id number]]  (Normal) Collected: 09/18/22 1609    Lab Status: Final result Specimen: Blood from Arm, Left Updated: 09/18/22 1632     Protime 14 2 seconds      INR 1 08    APTT [[id number]]  (Normal) Collected: 09/18/22 1609    Lab Status: Final result Specimen: Blood from Arm, Left Updated: 09/18/22 1632     PTT 30 seconds     CBC and differential [[id number]]  (Abnormal) Collected: 09/18/22 1609    Lab Status: Final result Specimen: Blood from Arm, Left Updated: 09/18/22 1625     WBC 5 40 Thousand/uL      RBC 4 52 Million/uL      Hemoglobin 14 2 g/dL      Hematocrit 42 5 %      MCV 94 fL      MCH 31 4 pg      MCHC 33 4 g/dL      RDW 11 6 %      MPV 10 6 fL      Platelets 692 Thousands/uL      nRBC 0 /100 WBCs      Neutrophils Relative 78 %      Immat GRANS % 0 %      Lymphocytes Relative 12 %      Monocytes Relative 9 %      Eosinophils Relative 1 %      Basophils Relative 0 %      Neutrophils Absolute 4 18 Thousands/µL      Immature Grans Absolute 0 01 Thousand/uL      Lymphocytes Absolute 0 66 Thousands/µL      Monocytes Absolute 0 50 Thousand/µL      Eosinophils Absolute 0 03 Thousand/µL      Basophils Absolute 0 02 Thousands/µL                  XR chest 1 view portable   Final Result by Pratibha Chatman MD (09/18 1640)      Right pleural drainage catheter inserted with resolution of right pneumothorax      Acute right 5th rib fracture on CT not visible  Workstation performed: [id number]         CT chest abdomen pelvis wo contrast   Final Result by Jade Bhatia MD (09/18 1642)      Moderately large right-sided pneumothorax  Trace right pleural effusion  Minimally displaced fracture of the right lateral 5th rib  Additional findings as above  I personally discussed this study with Edwin Duffyjonn on 9/18/2022 at 4:38 PM                      Workstation performed: [id number]         CT head without contrast   Final Result by Claus García MD (09/18 1632)      No acute intracranial abnormality  Workstation performed: [id number]                    Procedures  Pre-Procedural Sedation  Performed by: Erica Yang MD  Authorized by: Erica Yang MD     Consent:     Consent obtained:  Written    Consent given by:  Patient    Risks discussed:   Allergic reaction, dysrhythmia, inadequate sedation, nausea, vomiting, respiratory compromise necessitating ventilatory assistance and intubation, prolonged sedation necessitating reversal and prolonged hypoxia resulting in organ damage    Alternatives discussed:  Analgesia without sedation  Universal protocol:     Patient identity confirmation method:  Verbally with patient, arm band, provided demographic data, hospital-assigned identification number and anonymous protocol, patient vented/unresponsive  Indications:     Sedation purpose:  Chest tube placement    Procedure necessitating sedation performed by:  Physician performing sedation    Intended level of sedation:  Moderate (conscious sedation)  Pre-sedation assessment:     NPO status caution: urgency dictates proceeding with non-ideal NPO status      ASA classification: class 2 - patient with mild systemic disease      Neck mobility: normal      Mouth opening:  3 or more finger widths    Mallampati score:  I - soft palate, uvula, fauces, pillars visible    Pre-sedation assessments completed and reviewed: airway patency, cardiovascular function, hydration status, mental status, nausea/vomiting, pain level, respiratory function and temperature      History of difficult intubation: no      Pre-sedation assessment completed: 9/18/2022 4:20 PM    CriticalCare Time  Performed by: Meliza Rodgers MD  Authorized by: Meliza Rodgers MD     Critical care provider statement:     Critical care time (minutes):  35    Critical care time was exclusive of:  Separately billable procedures and treating other patients    Critical care was necessary to treat or prevent imminent or life-threatening deterioration of the following conditions:  Respiratory failure and trauma    Critical care was time spent personally by me on the following activities:  Evaluation of patient's response to treatment, examination of patient, review of old charts, re-evaluation of patient's condition, ordering and review of radiographic studies, ordering and review of laboratory studies and ordering and performing treatments and interventions    I assumed direction of critical care for this patient from another provider in my specialty: no               ED Course  ED Course as of 09/18/22 1649   Sun Sep 18, 2022   1610 Trauma alert was not called as the patient had a low mechanism for injury  Was running and fell forward only  Hit the ground only  No head injury  1610 Patient with no respiratory complaints  There was no crepitus on exam   Portable chest x-ray is not indicated at that time  Respiratory wise and hemodynamically was stable for CT scan  1631 Discussed with patient  Given the option of [facility] or [facility]    Patient prefers to go to [facility]                                              MDM    Disposition  Final diagnoses:   Traumatic pneumothorax, initial encounter   Closed fracture of one rib of right side, initial encounter     Time reflects when diagnosis was documented in both MDM as applicable and the Disposition within this note     Time User Action Codes Description Comment    9/18/2022  3:47 PM Carla Tiwari Add [S20 211A] Rib contusion, right, initial encounter     9/18/2022  4:32 PM Benjamin Sanford Remove [S20 211A] Rib contusion, right, initial encounter     9/18/2022  4:32 PM Carmelita Sanford Add [S27  0XXA] Traumatic pneumothorax, initial encounter     9/18/2022  4:38 PM Carmelita Sanford Add [S22 31XA] Closed fracture of one rib of right side, initial encounter       ED Disposition     ED Disposition   Transfer to Another Facility-In Network    Condition   --    Date/Time   Sun Sep 18, 2022  4:32 PM    Comment   Pepper Fillers should be transferred out to [facility] Documentation    [address] Most Recent Value   Patient Condition The patient has been stabilized such that within reasonable medical probability, no material deterioration of the patient condition or the condition of the unborn child(jeni) is likely to result from the transfer   Reason for Transfer Level of Care needed not available at this facility   Benefits of Transfer Specialized equipment and/or services available at the receiving facility (Include comment)________________________   Risks of Transfer Potential for delay in receiving treatment, Potential deterioration of medical condition, Loss of IV, Possible worsening of condition or death during transfer, Increased discomfort during transfer   Accepting Physician [address] Name, [address] [place]    (Name & Tel number) Janina at [facility]   Sending MD Jaimes 83   Provider Certification General risk, such as traffic hazards, adverse weather conditions, rough terrain or turbulence, possible failure of equipment (including vehicle or aircraft), or consequences of actions of persons outside the control of the transport personnel, Unanticipated needs of medical equipment and personnel during transport, Risk of worsening condition      RN Documentation    Flowsheet Row Most [address] [facility] Name, [address] [place]    (Name & Tel number) Janina at [address]     Follow up With Specialties Details Why Contact Info    Lindsay Merritt MD Family Medicine Call in 1 day  [address] [facility] [place],[facility] [address] [facility] [address]  [phone]            Patient's Medications   Discharge Prescriptions    No medications on file       No discharge procedures on file      PDMP Review     None          ED Provider  Electronically Signed by           Dipika Treviño MD  09/18/22 5650

## 2022-09-18 NOTE — ED NOTES
Report called to [id number] ED  Pt transported to [address] ED by [employer]   Pt had no s/s of distress, VS stable, A&Ox4     Sarah Li RN  09/18/22 6788

## 2022-09-18 NOTE — ED PROCEDURE NOTE
PROCEDURE  Procedural Sedation    Date/Time: 9/18/2022 4:20 PM  Performed by: Balwinder Gomez MD  Authorized by: Balwinder Gomez MD     Immediate pre-procedure details:     Reviewed: vital signs      Verified: bag valve mask available, emergency equipment available, intubation equipment available, IV patency confirmed, oxygen available, reversal medications available and suction available    Procedure details (see MAR for exact dosages):     Preoxygenation:  Nasal cannula    Sedation:  Ketamine    Intra-procedure monitoring:  Blood pressure monitoring, cardiac monitor, continuous capnometry, continuous pulse oximetry, frequent LOC assessments and frequent vital sign checks    Intra-procedure events: none      Sedation end time:  9/18/2022 4:28 PM    Total sedation time (minutes):  8  Post-procedure details:     Post-sedation assessment completed:  9/18/2022 4:40 PM    Attendance: Constant attendance by certified staff until patient recovered      Recovery: Patient returned to pre-procedure baseline      Post-sedation assessments completed and reviewed: airway patency, cardiovascular function, hydration status, mental status, pain level, respiratory function and temperature      Post-sedation assessments completed and reviewed: post-procedure nausea and vomiting status not reviewed      Patient is stable for discharge or admission: yes      Patient tolerance:   Tolerated well, no immediate complications  Chest Tube    Date/Time: 9/18/2022 4:29 PM  Performed by: Balwinder Gomez MD  Authorized by: Balwinder Gomez MD     Patient location:  ED  Other Assisting Provider: No    Consent:     Consent obtained:  Written    Consent given by:  Patient    Risks discussed:  Bleeding, damage to surrounding structures, infection, incomplete drainage, nerve damage and pain    Alternatives discussed:  Delayed treatment  Universal protocol:     Patient identity confirmed:  Arm band  Pre-procedure details:     Skin preparation: Betadine  Indications:     Indications: pneumothroax    Sedation:     Sedation type: Moderate (conscious) sedation (See separate Procedural Sedation form)  Anesthesia (see MAR for exact dosages): Anesthesia method:  Local infiltration    Local anesthetic:  Lidocaine 1% WITH epi  Procedure details:     Placement location:  Anterior    Laterality:  Right    Approach:  Percutaneous    Scalpel size:  10    Access kit used: 10 2 [employer] cook chest tube  Ultrasound guidance: no      Tension pneumothorax: no      Needle Decompression: no      Tube connected to:  Suction    Drainage characteristics:  Air only    Suture material:  2-0 silk    Dressing:  4x4 sterile gauze  Post-procedure details:     Post-insertion x-ray findings: tube in good position      Patient tolerance of procedure:   Tolerated well, no immediate complications         Noe Hunter MD  09/18/22 2555

## 2022-09-18 NOTE — EMTALA/ACUTE CARE TRANSFER
[address] [place] [id number]  Dept: [phone]      EMTALA TRANSFER CONSENT    NAME Ambar Pro                                         1940                              MRN [id number]    I have been informed of my rights regarding examination, treatment, and transfer   by Dr Nolan Fields MD    Benefits: Specialized equipment and/or services available at the receiving facility (Include comment)________________________    Risks: Potential for delay in receiving treatment, Potential deterioration of medical condition, Loss of IV, Possible worsening of condition or death during transfer, Increased discomfort during transfer          I authorize the performance of emergency medical procedures and treatments upon me in both transit and upon arrival at the receiving facility  Additionally, I authorize the release of any and all medical records to the receiving facility and request they be transported with me, if possible  I understand that the safest mode of transportation during a medical emergency is an ambulance and that the Hospital advocates the use of this mode of transport  Risks of traveling to the receiving facility by car, including absence of medical control, life sustaining equipment, such as oxygen, and medical personnel has been explained to me and I fully understand them  (BAO CORRECT BOX BELOW)  [  ]  I consent to the stated transfer and to be transported by ambulance/helicopter  [  ]  I consent to the stated transfer, but refuse transportation by ambulance and accept full responsibility for my transportation by car    I understand the risks of non-ambulance transfers and I exonerate the Hospital and its staff from any deterioration in my condition that results from this refusal     X___________________________________________    DATE  22  TIME________  Signature of patient or legally responsible individual signing on patient behalf           RELATIONSHIP TO PATIENT_________________________          Provider Certification    NAME Riley Rodriguez                                         1940                              MRN [id number]    A medical screening exam was performed on the above named patient  Based on the examination:    Condition Necessitating Transfer The primary encounter diagnosis was Traumatic pneumothorax, initial encounter  A diagnosis of Closed fracture of one rib of right side, initial encounter was also pertinent to this visit  Patient Condition: The patient has been stabilized such that within reasonable medical probability, no material deterioration of the patient condition or the condition of the unborn child(jeni) is likely to result from the transfer    Reason for Transfer: Level of Care needed not available at this facility    Transfer Requirements: Facility [employer] available and qualified personnel available for treatment as acknowledged by Iesha Warner at [facility]  · Agreed to accept transfer and to provide appropriate medical treatment as acknowledged by       [facility]  · Appropriate medical records of the examination and treatment of the patient are provided at the time of transfer   [address] [facility], [address] _______  · Transfer will be performed by qualified personnel from    and appropriate transfer equipment as required, including the use of necessary and appropriate life support measures      Provider Certification: I have examined the patient and explained the following risks and benefits of being transferred/refusing transfer to the patient/family:  General risk, such as traffic hazards, adverse weather conditions, rough terrain or turbulence, possible failure of equipment (including vehicle or aircraft), or consequences of actions of persons outside the control of the transport personnel, Unanticipated needs of medical equipment and personnel during transport, Risk of worsening condition      Based on these reasonable risks and benefits to the patient and/or the unborn child(jeni), and based upon the information available at the time of the patients examination, I certify that the medical benefits reasonably to be expected from the provision of appropriate medical treatments at another medical facility outweigh the increasing risks, if any, to the individuals medical condition, and in the case of labor to the unborn child, from effecting the transfer      X____________________________________________ DATE 09/18/22        TIME_______      ORIGINAL - SEND TO MEDICAL RECORDS   COPY - SEND WITH PATIENT DURING TRANSFER

## 2023-12-20 ENCOUNTER — HOSPITAL ENCOUNTER (INPATIENT)
Facility: HOSPITAL | Age: 83
LOS: 1 days | Discharge: HOME HEALTH | DRG: 179 | End: 2023-12-21
Attending: EMERGENCY MEDICINE | Admitting: INTERNAL MEDICINE
Payer: COMMERCIAL

## 2023-12-20 ENCOUNTER — APPOINTMENT (EMERGENCY)
Dept: RADIOLOGY | Facility: HOSPITAL | Age: 83
DRG: 179 | End: 2023-12-20
Payer: COMMERCIAL

## 2023-12-20 DIAGNOSIS — U07.1 COVID-19: Primary | ICD-10-CM

## 2023-12-20 DIAGNOSIS — R26.2 AMBULATORY DYSFUNCTION: ICD-10-CM

## 2023-12-20 LAB
ALBUMIN SERPL BCP-MCNC: 4.7 G/DL (ref 3.5–5)
ALP SERPL-CCNC: 52 U/L (ref 34–104)
ALT SERPL W P-5'-P-CCNC: 23 U/L (ref 7–52)
ANION GAP SERPL CALCULATED.3IONS-SCNC: 7 MMOL/L
AST SERPL W P-5'-P-CCNC: 22 U/L (ref 13–39)
BACTERIA UR QL AUTO: NORMAL /HPF
BASOPHILS # BLD AUTO: 0.02 THOUSANDS/ÂΜL (ref 0–0.1)
BASOPHILS NFR BLD AUTO: 0 % (ref 0–1)
BILIRUB SERPL-MCNC: 1.71 MG/DL (ref 0.2–1)
BILIRUB UR QL STRIP: ABNORMAL
BNP SERPL-MCNC: 383 PG/ML (ref 0–100)
BUN SERPL-MCNC: 21 MG/DL (ref 5–25)
CALCIUM SERPL-MCNC: 9.7 MG/DL (ref 8.4–10.2)
CHLORIDE SERPL-SCNC: 105 MMOL/L (ref 96–108)
CLARITY UR: CLEAR
CO2 SERPL-SCNC: 28 MMOL/L (ref 21–32)
COLOR UR: YELLOW
CREAT SERPL-MCNC: 0.85 MG/DL (ref 0.6–1.3)
EOSINOPHIL # BLD AUTO: 0 THOUSAND/ÂΜL (ref 0–0.61)
EOSINOPHIL NFR BLD AUTO: 0 % (ref 0–6)
ERYTHROCYTE [DISTWIDTH] IN BLOOD BY AUTOMATED COUNT: 12 % (ref 11.6–15.1)
FLUAV RNA RESP QL NAA+PROBE: NEGATIVE
FLUBV RNA RESP QL NAA+PROBE: NEGATIVE
GFR SERPL CREATININE-BSD FRML MDRD: 81 ML/MIN/1.73SQ M
GLUCOSE SERPL-MCNC: 107 MG/DL (ref 65–140)
GLUCOSE UR STRIP-MCNC: NEGATIVE MG/DL
HCT VFR BLD AUTO: 47.5 % (ref 36.5–49.3)
HGB BLD-MCNC: 15.5 G/DL (ref 12–17)
HGB UR QL STRIP.AUTO: ABNORMAL
IMM GRANULOCYTES # BLD AUTO: 0.03 THOUSAND/UL (ref 0–0.2)
IMM GRANULOCYTES NFR BLD AUTO: 0 % (ref 0–2)
KETONES UR STRIP-MCNC: ABNORMAL MG/DL
LACTATE SERPL-SCNC: 1.7 MMOL/L (ref 0.5–2)
LEUKOCYTE ESTERASE UR QL STRIP: NEGATIVE
LIPASE SERPL-CCNC: 8 U/L (ref 11–82)
LYMPHOCYTES # BLD AUTO: 0.23 THOUSANDS/ÂΜL (ref 0.6–4.47)
LYMPHOCYTES NFR BLD AUTO: 3 % (ref 14–44)
MAGNESIUM SERPL-MCNC: 1.8 MG/DL (ref 1.9–2.7)
MCH RBC QN AUTO: 30.3 PG (ref 26.8–34.3)
MCHC RBC AUTO-ENTMCNC: 32.6 G/DL (ref 31.4–37.4)
MCV RBC AUTO: 93 FL (ref 82–98)
MONOCYTES # BLD AUTO: 0.8 THOUSAND/ÂΜL (ref 0.17–1.22)
MONOCYTES NFR BLD AUTO: 10 % (ref 4–12)
NEUTROPHILS # BLD AUTO: 6.82 THOUSANDS/ÂΜL (ref 1.85–7.62)
NEUTS SEG NFR BLD AUTO: 87 % (ref 43–75)
NITRITE UR QL STRIP: NEGATIVE
NON-SQ EPI CELLS URNS QL MICRO: NORMAL /HPF
NRBC BLD AUTO-RTO: 0 /100 WBCS
PH UR STRIP.AUTO: 6 [PH]
PLATELET # BLD AUTO: 126 THOUSANDS/UL (ref 149–390)
PMV BLD AUTO: 11.2 FL (ref 8.9–12.7)
POTASSIUM SERPL-SCNC: 4.2 MMOL/L (ref 3.5–5.3)
PROT SERPL-MCNC: 7.2 G/DL (ref 6.4–8.4)
PROT UR STRIP-MCNC: ABNORMAL MG/DL
RBC # BLD AUTO: 5.12 MILLION/UL (ref 3.88–5.62)
RBC #/AREA URNS AUTO: NORMAL /HPF
RSV RNA RESP QL NAA+PROBE: NEGATIVE
SARS-COV-2 RNA RESP QL NAA+PROBE: POSITIVE
SODIUM SERPL-SCNC: 140 MMOL/L (ref 135–147)
SP GR UR STRIP.AUTO: 1.02 (ref 1–1.03)
UROBILINOGEN UR QL STRIP.AUTO: 1 E.U./DL
WBC # BLD AUTO: 7.9 THOUSAND/UL (ref 4.31–10.16)
WBC #/AREA URNS AUTO: NORMAL /HPF

## 2023-12-20 PROCEDURE — 83735 ASSAY OF MAGNESIUM: CPT | Performed by: EMERGENCY MEDICINE

## 2023-12-20 PROCEDURE — 83880 ASSAY OF NATRIURETIC PEPTIDE: CPT | Performed by: EMERGENCY MEDICINE

## 2023-12-20 PROCEDURE — 0241U HB NFCT DS VIR RESP RNA 4 TRGT: CPT | Performed by: EMERGENCY MEDICINE

## 2023-12-20 PROCEDURE — 99285 EMERGENCY DEPT VISIT HI MDM: CPT | Performed by: EMERGENCY MEDICINE

## 2023-12-20 PROCEDURE — 96360 HYDRATION IV INFUSION INIT: CPT

## 2023-12-20 PROCEDURE — 80053 COMPREHEN METABOLIC PANEL: CPT | Performed by: EMERGENCY MEDICINE

## 2023-12-20 PROCEDURE — 71046 X-RAY EXAM CHEST 2 VIEWS: CPT

## 2023-12-20 PROCEDURE — 85025 COMPLETE CBC W/AUTO DIFF WBC: CPT | Performed by: EMERGENCY MEDICINE

## 2023-12-20 PROCEDURE — 81001 URINALYSIS AUTO W/SCOPE: CPT | Performed by: EMERGENCY MEDICINE

## 2023-12-20 PROCEDURE — 87040 BLOOD CULTURE FOR BACTERIA: CPT | Performed by: EMERGENCY MEDICINE

## 2023-12-20 PROCEDURE — 99285 EMERGENCY DEPT VISIT HI MDM: CPT

## 2023-12-20 PROCEDURE — 83690 ASSAY OF LIPASE: CPT | Performed by: EMERGENCY MEDICINE

## 2023-12-20 PROCEDURE — XW033E5 INTRODUCTION OF REMDESIVIR ANTI-INFECTIVE INTO PERIPHERAL VEIN, PERCUTANEOUS APPROACH, NEW TECHNOLOGY GROUP 5: ICD-10-PCS | Performed by: INTERNAL MEDICINE

## 2023-12-20 PROCEDURE — 36415 COLL VENOUS BLD VENIPUNCTURE: CPT | Performed by: EMERGENCY MEDICINE

## 2023-12-20 PROCEDURE — 83605 ASSAY OF LACTIC ACID: CPT | Performed by: EMERGENCY MEDICINE

## 2023-12-20 PROCEDURE — 99223 1ST HOSP IP/OBS HIGH 75: CPT | Performed by: NURSE PRACTITIONER

## 2023-12-20 PROCEDURE — 93005 ELECTROCARDIOGRAM TRACING: CPT

## 2023-12-20 RX ORDER — MOMETASONE FUROATE 50 UG/1
2 SPRAY, METERED NASAL DAILY
COMMUNITY

## 2023-12-20 RX ORDER — MONTELUKAST SODIUM 10 MG/1
10 TABLET ORAL
Status: DISCONTINUED | OUTPATIENT
Start: 2023-12-20 | End: 2023-12-21 | Stop reason: HOSPADM

## 2023-12-20 RX ORDER — FAMOTIDINE 20 MG/1
20 TABLET, FILM COATED ORAL DAILY
Status: DISCONTINUED | OUTPATIENT
Start: 2023-12-21 | End: 2023-12-21 | Stop reason: HOSPADM

## 2023-12-20 RX ORDER — TAMSULOSIN HYDROCHLORIDE 0.4 MG/1
0.4 CAPSULE ORAL
Status: DISCONTINUED | OUTPATIENT
Start: 2023-12-20 | End: 2023-12-21 | Stop reason: HOSPADM

## 2023-12-20 RX ORDER — FLUTICASONE PROPIONATE 50 MCG
2 SPRAY, SUSPENSION (ML) NASAL DAILY
Status: DISCONTINUED | OUTPATIENT
Start: 2023-12-21 | End: 2023-12-21 | Stop reason: HOSPADM

## 2023-12-20 RX ORDER — FAMOTIDINE 20 MG/1
20 TABLET, FILM COATED ORAL DAILY
COMMUNITY

## 2023-12-20 RX ORDER — MIRABEGRON 50 MG/1
50 TABLET, FILM COATED, EXTENDED RELEASE ORAL DAILY
COMMUNITY

## 2023-12-20 RX ORDER — METOPROLOL TARTRATE 1 MG/ML
2.5 INJECTION, SOLUTION INTRAVENOUS ONCE
Status: COMPLETED | OUTPATIENT
Start: 2023-12-20 | End: 2023-12-20

## 2023-12-20 RX ORDER — BUDESONIDE AND FORMOTEROL FUMARATE DIHYDRATE 160; 4.5 UG/1; UG/1
1 AEROSOL RESPIRATORY (INHALATION) DAILY
Status: DISCONTINUED | OUTPATIENT
Start: 2023-12-21 | End: 2023-12-21 | Stop reason: HOSPADM

## 2023-12-20 RX ORDER — ASCORBIC ACID 500 MG
500 TABLET ORAL DAILY
Status: DISCONTINUED | OUTPATIENT
Start: 2023-12-21 | End: 2023-12-21 | Stop reason: HOSPADM

## 2023-12-20 RX ORDER — BUDESONIDE AND FORMOTEROL FUMARATE DIHYDRATE 160; 4.5 UG/1; UG/1
1 AEROSOL RESPIRATORY (INHALATION) DAILY
COMMUNITY

## 2023-12-20 RX ORDER — ACETAMINOPHEN 325 MG/1
650 TABLET ORAL EVERY 6 HOURS PRN
Status: DISCONTINUED | OUTPATIENT
Start: 2023-12-20 | End: 2023-12-21 | Stop reason: HOSPADM

## 2023-12-20 RX ORDER — PRAVASTATIN SODIUM 80 MG/1
80 TABLET ORAL
Status: DISCONTINUED | OUTPATIENT
Start: 2023-12-21 | End: 2023-12-21 | Stop reason: HOSPADM

## 2023-12-20 RX ORDER — SERTRALINE HYDROCHLORIDE 25 MG/1
50 TABLET, FILM COATED ORAL DAILY
Status: DISCONTINUED | OUTPATIENT
Start: 2023-12-21 | End: 2023-12-21 | Stop reason: HOSPADM

## 2023-12-20 RX ADMIN — SODIUM CHLORIDE 500 ML: 0.9 INJECTION, SOLUTION INTRAVENOUS at 18:15

## 2023-12-20 RX ADMIN — CARBIDOPA AND LEVODOPA 2 TABLET: 25; 100 TABLET ORAL at 22:35

## 2023-12-20 RX ADMIN — APIXABAN 5 MG: 5 TABLET, FILM COATED ORAL at 22:22

## 2023-12-20 RX ADMIN — REMDESIVIR 200 MG: 100 INJECTION, POWDER, LYOPHILIZED, FOR SOLUTION INTRAVENOUS at 22:36

## 2023-12-20 RX ADMIN — METOPROLOL TARTRATE 12.5 MG: 25 TABLET, FILM COATED ORAL at 22:22

## 2023-12-20 RX ADMIN — SODIUM CHLORIDE 500 ML: 0.9 INJECTION, SOLUTION INTRAVENOUS at 19:25

## 2023-12-20 RX ADMIN — MONTELUKAST 10 MG: 10 TABLET, FILM COATED ORAL at 22:22

## 2023-12-20 RX ADMIN — TAMSULOSIN HYDROCHLORIDE 0.4 MG: 0.4 CAPSULE ORAL at 22:22

## 2023-12-20 RX ADMIN — METOROPROLOL TARTRATE 2.5 MG: 5 INJECTION, SOLUTION INTRAVENOUS at 22:23

## 2023-12-20 NOTE — ED PROVIDER NOTES
History  Chief Complaint   Patient presents with    Weakness - Generalized     Patient brought in by ambulance from home for increased weakness over the last few days.      Woke up today with URI sx and generalized weakness.  Was too weak to ambulate or get out of bed.  Needed assistance from the wife, took an hour to get down the steps.  Patient denies nausea or vomiting.  No chest pain.        History provided by:  Patient   used: No    Fatigue  Severity:  Severe  Onset quality:  Unable to specify  Duration:  9 hours  Timing:  Constant  Progression:  Unchanged  Chronicity:  New  Relieved by:  Nothing  Worsened by:  Nothing  Ineffective treatments:  None tried  Associated symptoms: cough and difficulty walking    Associated symptoms: no abdominal pain, no arthralgias, no chest pain, no diarrhea, no dizziness, no drooling, no dysuria, no numbness in extremities, no falls, no fever, no foul-smelling urine, no frequency, no headaches, no hematochezia, no loss of consciousness, no myalgias, no nausea, no seizures, no shortness of breath, no stroke symptoms, no syncope and no vomiting        Prior to Admission Medications   Prescriptions Last Dose Informant Patient Reported? Taking?   Ascorbic Acid (VITAMIN C) 100 MG tablet   Yes No   Sig: Take 100 mg by mouth daily   Naproxen Sodium (ALEVE PO)   Yes No   Sig: Take by mouth   UNKNOWN TO PATIENT   Yes No   apixaban (ELIQUIS) 5 mg   No No   Sig: Take 1 tablet (5 mg total) by mouth 2 (two) times a day   aspirin 81 mg chewable tablet   No No   Sig: Chew 1 tablet (81 mg total) daily   calcium acetate (PHOSLO) 667 mg capsule   Yes No   Sig: Take 1,334 mg by mouth 3 (three) times a day with meals   diclofenac sodium (VOLTAREN) 1 %   Yes No   metoprolol tartrate (LOPRESSOR) 25 mg tablet   Yes No   Sig: Take 12.5 mg by mouth 2 (two) times a day   metroNIDAZOLE (METROGEL) 0.75 % gel   Yes No   Sig: Apply 1 application topically 2 (two) times a day To affected  area   montelukast (SINGULAIR) 10 mg tablet   Yes No   rosuvastatin (CRESTOR) 10 MG tablet   Yes No   Sig: Take 10 mg by mouth daily   tamsulosin (FLOMAX) 0.4 mg   Yes No      Facility-Administered Medications: None       Past Medical History:   Diagnosis Date    Asthma     Hypertension     Irregular heart beat     Kidney stones     Renal disorder     frequency    Shoulder pain        Past Surgical History:   Procedure Laterality Date    COLECTOMY  2018    CYSTOSCOPY      kidney stone retrieval    JOINT REPLACEMENT Right     knee       History reviewed. No pertinent family history.  I have reviewed and agree with the history as documented.    E-Cigarette/Vaping    E-Cigarette Use Never User      E-Cigarette/Vaping Substances     Social History     Tobacco Use    Smoking status: Never    Smokeless tobacco: Never   Vaping Use    Vaping status: Never Used   Substance Use Topics    Alcohol use: Not Currently     Comment: rarely    Drug use: Never       Review of Systems   Constitutional:  Positive for fatigue. Negative for chills and fever.   HENT:  Negative for drooling, ear pain, hearing loss, sore throat, trouble swallowing and voice change.    Eyes:  Negative for pain and discharge.   Respiratory:  Positive for cough. Negative for shortness of breath and wheezing.    Cardiovascular:  Negative for chest pain, palpitations and syncope.   Gastrointestinal:  Negative for abdominal pain, blood in stool, constipation, diarrhea, hematochezia, nausea and vomiting.   Genitourinary:  Negative for dysuria, flank pain, frequency and hematuria.   Musculoskeletal:  Negative for arthralgias, falls, joint swelling, myalgias, neck pain and neck stiffness.   Skin:  Negative for rash and wound.   Neurological:  Negative for dizziness, seizures, loss of consciousness, syncope, facial asymmetry and headaches.   Psychiatric/Behavioral:  Negative for hallucinations, self-injury and suicidal ideas.    All other systems reviewed and are  negative.      Physical Exam  Physical Exam  Vitals and nursing note reviewed.   Constitutional:       General: He is not in acute distress.     Appearance: He is well-developed.   HENT:      Head: Normocephalic and atraumatic.      Right Ear: External ear normal.      Left Ear: External ear normal.   Eyes:      General: No scleral icterus.        Right eye: No discharge.         Left eye: No discharge.      Extraocular Movements: Extraocular movements intact.      Conjunctiva/sclera: Conjunctivae normal.   Cardiovascular:      Rate and Rhythm: Tachycardia present. Rhythm irregularly irregular.      Heart sounds: Normal heart sounds. No murmur heard.  Pulmonary:      Effort: Pulmonary effort is normal.      Breath sounds: Normal breath sounds. No wheezing or rales.   Abdominal:      General: Bowel sounds are normal. There is no distension.      Palpations: Abdomen is soft.      Tenderness: There is no abdominal tenderness. There is no guarding or rebound.   Musculoskeletal:         General: No deformity. Normal range of motion.      Cervical back: Normal range of motion and neck supple.   Skin:     General: Skin is warm and dry.      Findings: No rash.   Neurological:      General: No focal deficit present.      Mental Status: He is alert and oriented to person, place, and time.      Cranial Nerves: No cranial nerve deficit.      Comments: Although there are no focal motor deficits, patient is too weak to ambulate or stand unassisted   Psychiatric:         Mood and Affect: Mood normal.         Behavior: Behavior normal.         Thought Content: Thought content normal.         Judgment: Judgment normal.         Vital Signs  ED Triage Vitals [12/20/23 1733]   Temperature Pulse Respirations Blood Pressure SpO2   99.6 °F (37.6 °C) (!) 119 18 149/94 95 %      Temp Source Heart Rate Source Patient Position - Orthostatic VS BP Location FiO2 (%)   Oral Monitor Lying Right arm --      Pain Score       --           Vitals:     12/20/23 1733 12/20/23 1745 12/20/23 1845   BP: 149/94 132/83 159/83   Pulse: (!) 119 (!) 116 (!) 127   Patient Position - Orthostatic VS: Lying  Lying         Visual Acuity      ED Medications  Medications   sodium chloride 0.9 % bolus 500 mL (500 mL Intravenous New Bag 12/20/23 1925)   sodium chloride 0.9 % bolus 500 mL (0 mL Intravenous Stopped 12/20/23 1907)       Diagnostic Studies  Results Reviewed       Procedure Component Value Units Date/Time    Urine Microscopic [[id number]]  (Normal) Collected: 12/20/23 1906    Lab Status: Final result Specimen: Urine, Clean Catch Updated: 12/20/23 1923     RBC, UA 2-4 /hpf      WBC, UA None Seen /hpf      Epithelial Cells Occasional /hpf      Bacteria, UA None Seen /hpf     UA w Reflex to Microscopic w Reflex to Culture [[id number]]  (Abnormal) Collected: 12/20/23 1906    Lab Status: Final result Specimen: Urine, Clean Catch Updated: 12/20/23 1913     Color, UA Yellow     Clarity, UA Clear     Specific Gravity, UA 1.020     pH, UA 6.0     Leukocytes, UA Negative     Nitrite, UA Negative     Protein, UA Trace mg/dl      Glucose, UA Negative mg/dl      Ketones, UA 15 (1+) mg/dl      Urobilinogen, UA 1.0 E.U./dl      Bilirubin, UA Small     Occult Blood, UA Small    COVID19, Influenza A/B, RSV PCR, [facility] [[id number]]  (Abnormal) Collected: 12/20/23 1735    Lab Status: Final result Specimen: Nares from Nose Updated: 12/20/23 1859     SARS-CoV-2 Positive     INFLUENZA A PCR Negative     INFLUENZA B PCR Negative     RSV PCR Negative    Narrative:      FOR PEDIATRIC PATIENTS - copy/paste COVID Guidelines URL to browser: [email or web address]    SARS-CoV-2 assay is a Nucleic Acid Amplification assay intended for the  qualitative detection of nucleic acid from SARS-CoV-2 in nasopharyngeal  swabs. Results are for the presumptive identification of SARS-CoV-2 RNA.    Positive results are indicative of infection with SARS-CoV-2, the  virus  causing COVID-19, but do not rule out bacterial infection or co-infection  with other viruses. Laboratories within the [place] and its  territories are required to report all positive results to the appropriate  public health authorities. Negative results do not preclude SARS-CoV-2  infection and should not be used as the sole basis for treatment or other  patient management decisions. Negative results must be combined with  clinical observations, patient history, and epidemiological information.  This test has not been FDA cleared or approved.    This test has been authorized by FDA under an Emergency Use Authorization  (EUA). This test is only authorized for the duration of time the  declaration that circumstances exist justifying the authorization of the  emergency use of an in vitro diagnostic tests for detection of SARS-CoV-2  virus and/or diagnosis of COVID-19 infection under section 564(b)(1) of  the Act, 21 U.S.C. 360bbb-3(b)(1), unless the authorization is terminated  or revoked sooner. The test has been validated but independent review by FDA  and CLIA is pending.    Test performed using [email or web address] GeneXpert: This RT-PCR assay targets N2,  a region unique to SARS-CoV-2. A conserved region in the E-gene was chosen  for pan-Sarbecovirus detection which includes SARS-CoV-2.    According to CMS-2020-01-R, this platform meets the definition of high-throughput technology.    B-Type Natriuretic Peptide(BNP) [[id number]]  (Abnormal) Collected: 12/20/23 1735    Lab Status: Final result Specimen: Blood from Arm, Left Updated: 12/20/23 1838      pg/mL     Lactic acid, plasma (w/reflex if result > 2.0) [[id number]]  (Normal) Collected: 12/20/23 1735    Lab Status: Final result Specimen: Blood from Arm, Left Updated: 12/20/23 1831     LACTIC ACID 1.7 mmol/L     Narrative:      Result may be elevated if tourniquet was used during collection.    Comprehensive metabolic panel [[id number]]  (Abnormal)  Collected: 12/20/23 1735    Lab Status: Final result Specimen: Blood from Arm, Left Updated: 12/20/23 1831     Sodium 140 mmol/L      Potassium 4.2 mmol/L      Chloride 105 mmol/L      CO2 28 mmol/L      ANION GAP 7 mmol/L      BUN 21 mg/dL      Creatinine 0.85 mg/dL      Glucose 107 mg/dL      Calcium 9.7 mg/dL      AST 22 U/L      ALT 23 U/L      Alkaline Phosphatase 52 U/L      Total Protein 7.2 g/dL      Albumin 4.7 g/dL      Total Bilirubin 1.71 mg/dL      eGFR 81 ml/min/1.73sq m     Narrative:      National Kidney Disease Foundation guidelines for Chronic Kidney Disease (CKD):     Stage 1 with normal or high GFR (GFR > 90 mL/min/1.73 square meters)    Stage 2 Mild CKD (GFR = 60-89 mL/min/1.73 square meters)    Stage 3A Moderate CKD (GFR = 45-59 mL/min/1.73 square meters)    Stage 3B Moderate CKD (GFR = 30-44 mL/min/1.73 square meters)    Stage 4 Severe CKD (GFR = 15-29 mL/min/1.73 square meters)    Stage 5 End Stage CKD (GFR <15 mL/min/1.73 square meters)  Note: GFR calculation is accurate only with a steady state creatinine    Lipase [[id number]]  (Abnormal) Collected: 12/20/23 1735    Lab Status: Final result Specimen: Blood from Arm, Left Updated: 12/20/23 1831     Lipase 8 u/L     Magnesium [[id number]]  (Abnormal) Collected: 12/20/23 1735    Lab Status: Final result Specimen: Blood from Arm, Left Updated: 12/20/23 1831     Magnesium 1.8 mg/dL     Blood culture #2 [[id number]] Collected: 12/20/23 1815    Lab Status: In process Specimen: Blood from Arm, Right Updated: 12/20/23 1819    CBC and differential [[id number]]  (Abnormal) Collected: 12/20/23 1735    Lab Status: Final result Specimen: Blood from Arm, Left Updated: 12/20/23 1816     WBC 7.90 Thousand/uL      RBC 5.12 Million/uL      Hemoglobin 15.5 g/dL      Hematocrit 47.5 %      MCV 93 fL      MCH 30.3 pg      MCHC 32.6 g/dL      RDW 12.0 %      MPV 11.2 fL      Platelets 126 Thousands/uL      nRBC 0 /100 WBCs      Neutrophils Relative 87 %      Immat  GRANS % 0 %      Lymphocytes Relative 3 %      Monocytes Relative 10 %      Eosinophils Relative 0 %      Basophils Relative 0 %      Neutrophils Absolute 6.82 Thousands/µL      Immature Grans Absolute 0.03 Thousand/uL      Lymphocytes Absolute 0.23 Thousands/µL      Monocytes Absolute 0.80 Thousand/µL      Eosinophils Absolute 0.00 Thousand/µL      Basophils Absolute 0.02 Thousands/µL     Blood culture #1 [[id number]] Collected: 12/20/23 1735    Lab Status: In process Specimen: Blood from Arm, Left Updated: 12/20/23 1809                   XR chest pa & lateral   ED Interpretation by Kostas Kramer MD (12/20 1839)   No acute finding                 Procedures  ECG 12 Lead Documentation Only    Date/Time: 12/20/2023 5:32 PM    Performed by: Kostas Kramer MD  Authorized by: Kostas Kramer MD    ECG reviewed by me, the ED Provider: yes    Patient location:  ED  Previous ECG:     Previous ECG:  Unavailable  Interpretation:     Interpretation: abnormal    Rate:     ECG rate:  123    ECG rate assessment: tachycardic    Rhythm:     Rhythm: atrial fibrillation    Ectopy:     Ectopy: none    QRS:     QRS axis:  Normal    QRS intervals:  Normal  Conduction:     Conduction: normal    ST segments:     ST segments:  Normal  T waves:     T waves: normal             ED Course                                             Medical Decision Making  Patient presents to the emergency department with generalized weakness.      Based on the MSE and the history provided, diagnostic considerations include but are not limited to COVID/flu/RSV, pneumonia, dehydration, electrolyte abnormality, dysrhythmia, CHF    Based on the work-up performed in the emergency department which includes physical examination, laboratory testing, imaging which may include advanced imaging as necessary such as CT scan or ultrasound, it is deemed that the patient will require admission to the hospital for treatment of COVID with generalized weakness and  ambulatory dysfunction.      Amount and/or Complexity of Data Reviewed  Labs: ordered. Decision-making details documented in ED Course.     Details: COVID testing positive  Radiology: ordered and independent interpretation performed. Decision-making details documented in ED Course.     Details: Chest x-ray negative  ECG/medicine tests: ordered and independent interpretation performed. Decision-making details documented in ED Course.     Details: Atrial fibrillation rate 123             Disposition  Final diagnoses:   COVID-19   Ambulatory dysfunction     Time reflects when diagnosis was documented in both MDM as applicable and the Disposition within this note       Time User Action Codes Description Comment    12/20/2023  7:27 PM oKstas Kramer Add [U07.1] COVID-19     12/20/2023  7:27 PM Kostas Kramer Add [R26.2] Ambulatory dysfunction           ED Disposition       ED Disposition   Admit    Condition   Stable    Date/Time   Wed Dec 20, 2023  7:27 PM    Comment   --             Follow-up Information    None         Patient's Medications   Discharge Prescriptions    No medications on file       No discharge procedures on file.    PDMP Review       None            ED Provider  Electronically Signed by             Kostas Kramer MD  12/20/23 [id number]

## 2023-12-21 ENCOUNTER — HOME HEALTH ADMISSION (OUTPATIENT)
Dept: HOME HEALTH SERVICES | Facility: HOME HEALTHCARE | Age: 83
End: 2023-12-21
Payer: COMMERCIAL

## 2023-12-21 VITALS
OXYGEN SATURATION: 96 % | DIASTOLIC BLOOD PRESSURE: 88 MMHG | RESPIRATION RATE: 18 BRPM | SYSTOLIC BLOOD PRESSURE: 124 MMHG | HEART RATE: 94 BPM | BODY MASS INDEX: 26.1 KG/M2 | TEMPERATURE: 99.6 F | WEIGHT: 181.88 LBS

## 2023-12-21 PROBLEM — R53.1 GENERALIZED WEAKNESS: Status: ACTIVE | Noted: 2023-12-21

## 2023-12-21 PROBLEM — U07.1 COVID-19 VIRUS INFECTION: Status: ACTIVE | Noted: 2023-12-21

## 2023-12-21 PROBLEM — I48.91 ATRIAL FIBRILLATION WITH RVR (HCC): Status: ACTIVE | Noted: 2023-12-21

## 2023-12-21 PROBLEM — G20.A1 PARKINSON'S DISEASE: Status: ACTIVE | Noted: 2023-12-21

## 2023-12-21 PROBLEM — R26.2 AMBULATORY DYSFUNCTION: Status: ACTIVE | Noted: 2023-12-21

## 2023-12-21 LAB
ALBUMIN SERPL BCP-MCNC: 4 G/DL (ref 3.5–5)
ALP SERPL-CCNC: 45 U/L (ref 34–104)
ALT SERPL W P-5'-P-CCNC: 4 U/L (ref 7–52)
ANION GAP SERPL CALCULATED.3IONS-SCNC: 5 MMOL/L
AST SERPL W P-5'-P-CCNC: 42 U/L (ref 13–39)
BILIRUB SERPL-MCNC: 1.07 MG/DL (ref 0.2–1)
BUN SERPL-MCNC: 17 MG/DL (ref 5–25)
CALCIUM SERPL-MCNC: 9 MG/DL (ref 8.4–10.2)
CHLORIDE SERPL-SCNC: 104 MMOL/L (ref 96–108)
CO2 SERPL-SCNC: 30 MMOL/L (ref 21–32)
CREAT SERPL-MCNC: 0.79 MG/DL (ref 0.6–1.3)
ERYTHROCYTE [DISTWIDTH] IN BLOOD BY AUTOMATED COUNT: 12.3 % (ref 11.6–15.1)
GFR SERPL CREATININE-BSD FRML MDRD: 84 ML/MIN/1.73SQ M
GLUCOSE SERPL-MCNC: 95 MG/DL (ref 65–140)
HCT VFR BLD AUTO: 43.6 % (ref 36.5–49.3)
HGB BLD-MCNC: 14.4 G/DL (ref 12–17)
MAGNESIUM SERPL-MCNC: 2.5 MG/DL (ref 1.9–2.7)
MCH RBC QN AUTO: 31.2 PG (ref 26.8–34.3)
MCHC RBC AUTO-ENTMCNC: 33 G/DL (ref 31.4–37.4)
MCV RBC AUTO: 94 FL (ref 82–98)
PLATELET # BLD AUTO: 119 THOUSANDS/UL (ref 149–390)
PMV BLD AUTO: 10.9 FL (ref 8.9–12.7)
POTASSIUM SERPL-SCNC: 4 MMOL/L (ref 3.5–5.3)
PROT SERPL-MCNC: 6.3 G/DL (ref 6.4–8.4)
QRS AXIS: 16 DEGREES
QRSD INTERVAL: 74 MS
QT INTERVAL: 282 MS
QTC INTERVAL: 403 MS
RBC # BLD AUTO: 4.62 MILLION/UL (ref 3.88–5.62)
SODIUM SERPL-SCNC: 139 MMOL/L (ref 135–147)
T WAVE AXIS: 71 DEGREES
VENTRICULAR RATE: 123 BPM
WBC # BLD AUTO: 6.01 THOUSAND/UL (ref 4.31–10.16)

## 2023-12-21 PROCEDURE — 80053 COMPREHEN METABOLIC PANEL: CPT | Performed by: NURSE PRACTITIONER

## 2023-12-21 PROCEDURE — 83735 ASSAY OF MAGNESIUM: CPT | Performed by: NURSE PRACTITIONER

## 2023-12-21 PROCEDURE — 97167 OT EVAL HIGH COMPLEX 60 MIN: CPT

## 2023-12-21 PROCEDURE — 97116 GAIT TRAINING THERAPY: CPT

## 2023-12-21 PROCEDURE — 36415 COLL VENOUS BLD VENIPUNCTURE: CPT | Performed by: NURSE PRACTITIONER

## 2023-12-21 PROCEDURE — 99239 HOSP IP/OBS DSCHRG MGMT >30: CPT | Performed by: INTERNAL MEDICINE

## 2023-12-21 PROCEDURE — 85027 COMPLETE CBC AUTOMATED: CPT | Performed by: NURSE PRACTITIONER

## 2023-12-21 PROCEDURE — 97163 PT EVAL HIGH COMPLEX 45 MIN: CPT

## 2023-12-21 RX ORDER — METOPROLOL TARTRATE 1 MG/ML
2.5 INJECTION, SOLUTION INTRAVENOUS EVERY 6 HOURS PRN
Status: DISCONTINUED | OUTPATIENT
Start: 2023-12-21 | End: 2023-12-21 | Stop reason: HOSPADM

## 2023-12-21 RX ORDER — MAGNESIUM SULFATE HEPTAHYDRATE 40 MG/ML
2 INJECTION, SOLUTION INTRAVENOUS ONCE
Status: COMPLETED | OUTPATIENT
Start: 2023-12-21 | End: 2023-12-21

## 2023-12-21 RX ADMIN — MAGNESIUM SULFATE HEPTAHYDRATE 2 G: 40 INJECTION, SOLUTION INTRAVENOUS at 02:20

## 2023-12-21 RX ADMIN — FLUTICASONE PROPIONATE 2 SPRAY: 50 SPRAY, METERED NASAL at 08:33

## 2023-12-21 RX ADMIN — FAMOTIDINE 20 MG: 20 TABLET, FILM COATED ORAL at 08:22

## 2023-12-21 RX ADMIN — METOPROLOL TARTRATE 12.5 MG: 25 TABLET, FILM COATED ORAL at 08:22

## 2023-12-21 RX ADMIN — OXYCODONE HYDROCHLORIDE AND ACETAMINOPHEN 500 MG: 500 TABLET ORAL at 08:22

## 2023-12-21 RX ADMIN — SERTRALINE 50 MG: 25 TABLET, FILM COATED ORAL at 08:22

## 2023-12-21 RX ADMIN — CARBIDOPA AND LEVODOPA 2 TABLET: 25; 100 TABLET ORAL at 08:22

## 2023-12-21 RX ADMIN — APIXABAN 5 MG: 5 TABLET, FILM COATED ORAL at 08:22

## 2023-12-21 RX ADMIN — BUDESONIDE AND FORMOTEROL FUMARATE DIHYDRATE 1 PUFF: 160; 4.5 AEROSOL RESPIRATORY (INHALATION) at 08:28

## 2023-12-21 NOTE — CASE MANAGEMENT
Case Management Discharge Planning Note    Patient name Maurizio Wesley  Location ED 08/ED 08 MRN [id number]  : 1940 Date 2023       Current Admission Date: 2023  Current Admission Diagnosis:Generalized weakness   Patient Active Problem List    Diagnosis Date Noted    Ambulatory dysfunction 2023    Generalized weakness 2023    Parkinson's disease 2023    Atrial fibrillation with RVR (HCC) 2023    COVID-19 virus infection 2023    Nephrolithiasis 2021    BPH (benign prostatic hyperplasia) 2021    Asthma 2021    HTN (hypertension) 2021    Atrial flutter (HCC) 2021      LOS (days): 1  Geometric Mean LOS (GMLOS) (days): 2.5  Days to GMLOS:1.7     OBJECTIVE:  Risk of Unplanned Readmission Score: 10.65         Current admission status: Inpatient   Preferred Pharmacy:   [id number] - [address] [address]  Phone: [phone] Fax: [phone]    Primary Care Provider: Oskar Marc MD    Primary Insurance: [employer]  Secondary Insurance:     DISCHARGE DETAILS:  Pt is agreeable to [facility]. Pt does not have agency preference. Cm will put blank referral into [facility]

## 2023-12-21 NOTE — DISCHARGE INSTR - AVS FIRST PAGE
COVID-19 Home Care Guidelines    Your healthcare provider and/or public health staff have evaluated you and have determined that you do not need to remain in the hospital at this time.  At this time you can be isolated at home where you will be monitored by staff from your local or state health department. You should carefully follow the prevention and isolation steps below until a healthcare provider or local or state health department says that you can return to your normal activities.      Stay home except to get medical care    People who are mildly ill with COVID-19 are able to isolate at home during their illness. You should restrict activities outside your home, except for getting medical care. Do not go to work, school, or public areas. Avoid using public transportation, ride-sharing, or taxis.    Separate yourself from other people and animals in your home    People: As much as possible, you should stay in a specific room and away from other people in your home. Also, you should use a separate bathroom, if available.  Animals: You should restrict contact with pets and other animals while you are sick with COVID-19, just like you would around other people. Although there have not been reports of pets or other animals becoming sick with COVID-19, it is still recommended that people sick with COVID-19 limit contact with animals until more information is known about the virus. When possible, have another member of your household care for your animals while you are sick. If you are sick with COVID-19, avoid contact with your pet, including petting, snuggling, being kissed or licked, and sharing food. If you must care for your pet or be around animals while you are sick, wash your hands before and after you interact with pets and wear a facemask. See COVID-19 and Animals for more information.    Call ahead before visiting your doctor    If you have a medical appointment, call the healthcare provider and tell them  that you have or may have COVID-19. This will help the healthcare provider’s office take steps to keep other people from getting infected or exposed.    Wear a facemask    You should wear a facemask when you are around other people (e.g., sharing a room or vehicle) or pets and before you enter a healthcare provider’s office. If you are not able to wear a facemask (for example, because it causes trouble breathing), then people who live with you should not stay in the same room with you, or they should wear a facemask if they enter your room.    Cover your coughs and sneezes    Cover your mouth and nose with a tissue when you cough or sneeze. Throw used tissues in a lined trash can. Immediately wash your hands with soap and water for at least 20 seconds or, if soap and water are not available, clean your hands with an alcohol-based hand  that contains at least 60% alcohol.    Clean your hands often    Wash your hands often with soap and water for at least 20 seconds, especially after blowing your nose, coughing, or sneezing; going to the bathroom; and before eating or preparing food. If soap and water are not readily available, use an alcohol-based hand  with at least 60% alcohol, covering all surfaces of your hands and rubbing them together until they feel dry.  Soap and water are the best option if hands are visibly dirty. Avoid touching your eyes, nose, and mouth with unwashed hands.    Avoid sharing personal household items    You should not share dishes, drinking glasses, cups, eating utensils, towels, or bedding with other people or pets in your home. After using these items, they should be washed thoroughly with soap and water.    Clean all “high-touch” surfaces everyday    High touch surfaces include counters, tabletops, doorknobs, bathroom fixtures, toilets, phones, keyboards, tablets, and bedside tables. Also, clean any surfaces that may have blood, stool, or body fluids on them. Use a  household cleaning spray or wipe, according to the label instructions. Labels contain instructions for safe and effective use of the cleaning product including precautions you should take when applying the product, such as wearing gloves and making sure you have good ventilation during use of the product.    Monitor your symptoms    Seek prompt medical attention if your illness is worsening (e.g., difficulty breathing). Before seeking care, call your healthcare provider and tell them that you have, or are being evaluated for, COVID-19. Put on a facemask before you enter the facility. These steps will help the healthcare provider’s office to keep other people in the office or waiting room from getting infected or exposed. Ask your healthcare provider to call the local or state health department. Persons who are placed under active monitoring or facilitated self-monitoring should follow instructions provided by their local health department or occupational health professionals, as appropriate.  If you have a medical emergency and need to call 911, notify the dispatch personnel that you have, or are being evaluated for COVID-19. If possible, put on a facemask before emergency medical services arrive.    Discontinuing home isolation    Patients with confirmed COVID-19 should remain under home isolation precautions until the following conditions are met:   They have had no fever for at least 24 hours (that is one full day of no fever without the use medicine that reduces fevers)  AND  other symptoms have improved (for example, when their cough or shortness of breath have improved)  AND  If had mild or moderate illness, at least 10 days have passed since their symptoms first appeared or if severe illness (needed oxygen) or immunosuppressed, at least 20 days have passed since symptoms first appeared  Patients with confirmed COVID-19 should also notify close contacts (including their workplace) and ask that they  self-quarantine. Currently, close contact is defined as being within 6 feet for 15 minutes or more from the period 24 hours starting 48 hours before symptom onset to the time at which the patient went into isolation.  Close contacts of patients diagnosed with COVID-19 should be instructed by the patient to self-quarantine for 14 days from the last time of their last contact with the patient.     Source: https://www.cdc.gov/coronavirus/2019-ncov/hcp/guidance-prevent-spread.html

## 2023-12-21 NOTE — ASSESSMENT & PLAN NOTE
Parkinson's, resides with his wife and is normally ambulatory at home  Today had freezing episode, found to have COVID  Appreciate PT/OT/case management

## 2023-12-21 NOTE — ASSESSMENT & PLAN NOTE
History A-fib chronically anticoagulated with Eliquis  Did not take his metoprolol earlier in the day and now RVR at time of admission  Improved with 1 dose IV metoprolol  Continue PTA metoprolol 12.5 mg twice daily  Heart rate has been well-controlled.

## 2023-12-21 NOTE — ASSESSMENT & PLAN NOTE
Episode of generalized weakness, inability to ambulate earlier in the day prompting ED evaluation  Less than 24 hours URI symptoms  Denies fever/chills/nausea/vomiting/diarrhea  COVID PCR + 12/20/2023  Not requiring supplemental oxygen  CXR without infiltrate or effusion  Remdesivir for high risk patient, wife is agreeable to treatment plan respiratory status remained stable.  Stable for discharge home with maintaining isolation per COVID guidelines.

## 2023-12-21 NOTE — ED NOTES
This RN at the bedside to ambulate pt. Pt unable to get out of bed without assistance. Provider made aware.     Susan Dunaway RN  12/20/23 2000

## 2023-12-21 NOTE — ASSESSMENT & PLAN NOTE
Follows with [facility] neurology  Continue PTA Sinemet 3 times daily  Outpatient follow-up   no

## 2023-12-21 NOTE — UTILIZATION REVIEW
Initial Clinical Review    Admission: Date/Time/Statement:   Admission Orders (From admission, onward)       Ordered        12/20/23 1928  INPATIENT ADMISSION  Once                          Orders Placed This Encounter   Procedures    INPATIENT ADMISSION     Standing Status:   Standing     Number of Occurrences:   1     Order Specific Question:   Level of Care     Answer:   Med Surg [16]     Order Specific Question:   Estimated length of stay     Answer:   More than 2 Midnights     Order Specific Question:   Certification     Answer:   I certify that inpatient services are medically necessary for this patient for a duration of greater than two midnights. See H&P and MD Progress Notes for additional information about the patient's course of treatment.     ED Arrival Information       Expected   -    Arrival   12/20/2023 17:27    Acuity   Urgent              Means of arrival   Ambulance    Escorted by   [facility]    Service   Hospitalist    Admission type   Emergency              Arrival complaint   Weakness             Chief Complaint   Patient presents with    Weakness - Generalized     Patient brought in by ambulance from home for increased weakness over the last few days.        Initial Presentation: 83 y.o. male to ED via EMS from home   Present to ED with  generalized weakness and inability to ambulate.  Patient was unable to ambulate and ADLs   PMHX Parkinson's, A-fib chronically anticoagulated with Eliquis, right carotid terminus cerebral aneurysm, GERD, hyperlipidemia, hypertension, migraines    Admitted to [facility] with DX: Generalized weakness   on exam: COVID-positive PCR; T 99.6; tachy - irregular; tachypnea; hypertensive  PLAN: cont remdesivir iv; monitor labs; PT/ OT eval - tx; monitor respiratory status; trend BP's      Date: 12/21/23      Day 2  Discharge Summary  Hospital Course:   Maurizio Wesley is a 83 y.o. male patient who originally presented to the hospital on 12/20/2023 due to generalized  weakness.  Was found to be COVID-positive.  Started empirically on remdesivir but did not have any symptoms.  Seen by PT OT.  Stable for discharge home with a walker with home care which was set up on discharge.  Will follow-up with primary care physician within 1 week of discharge.    Disposition:  Home with [facility] Services       ED Triage Vitals   Temperature Pulse Respirations Blood Pressure SpO2   12/20/23 1733 12/20/23 1733 12/20/23 1733 12/20/23 1733 12/20/23 1733   99.6 °F (37.6 °C) (!) 119 18 149/94 95 %      Temp Source Heart Rate Source Patient Position - Orthostatic VS BP Location FiO2 (%)   12/20/23 1733 12/20/23 1733 12/20/23 1733 12/20/23 1733 --   Oral Monitor Lying Right arm       Pain Score       12/21/23 0530       No Pain          Wt Readings from Last 1 Encounters:   12/20/23 82.5 kg (181 lb 14.1 oz)     Additional Vital Signs:   Date/Time Temp Pulse Resp BP MAP (mmHg) SpO2 O2 Device Patient Position - Orthostatic VS   12/21/23 1030 -- 120 Abnormal   -- 123/80 97 -- -- --   Pulse: Afib on monitor at 12/21/23 1030   12/21/23 1028 -- -- -- 115/71 88 -- -- --    Patient Position - Orthostatic VS: reclined in strecther at 12/21/23 1028   12/21/23 0815 -- 102 18 124/66 85 96 % None (Room air) --   12/20/23 2330 -- 99 17 140/87 106 94 % -- --   12/20/23 2315 -- 101 17 153/105 Abnormal  120 95 % -- --   12/20/23 2222 -- 133 Abnormal  -- 156/97 -- -- -- --   12/20/23 2215 -- 128 Abnormal  13 156/97 120 96 % -- --   12/20/23 2200 -- 117 Abnormal  20 171/94 Abnormal  127 96 % -- --   12/20/23 2145 -- 120 Abnormal  16 156/95 121 94 % -- --   12/20/23 2130 -- 114 Abnormal  19 162/73 87 94 % -- --   12/20/23 2115 -- 140 Abnormal  19 176/81 Abnormal  117 94 % -- --   12/20/23 2100 -- 114 Abnormal  20 168/94 122 94 % None (Room air) Lying   12/20/23 2045 -- 133 Abnormal  23 Abnormal  168/106 Abnormal  126 95 % -- --   12/20/23 2030 -- 139 Abnormal  24 Abnormal  185/104 Abnormal  134 95 % -- --   12/20/23 2015 --  134 Abnormal  22 155/103 Abnormal  122 95 % None (Room air) Lying   12/20/23 2000 -- 138 Abnormal  26 Abnormal  167/109 Abnormal  126 92 % -- --   12/20/23 1945 -- 140 Abnormal  22 157/109 Abnormal  127 96 % -- --   12/20/23 1930 -- 139 Abnormal  20 161/113 Abnormal  131 96 % -- --   12/20/23 1915 -- 131 Abnormal  21 182/121 Abnormal  146 97 % -- --   12/20/23 1900 -- 138 Abnormal  24 Abnormal  162/112 Abnormal  132 96 % -- --   12/20/23 1845 -- 127 Abnormal  17 159/83 115 96 % None (Room air) Lying   12/20/23 1830 -- 127 Abnormal  15 149/99 120 95 % -- --   12/20/23 1800 -- 123 Abnormal  -- 144/70 98 96 % -- --   12/20/23 1745 -- 116 Abnormal  18 132/83 102 94 % -- --   12/20/23 1733 99.6 °F (37.6 °C) 119 Abnormal  18 149/94 -- 95 % None (Room air) Lying         EKG: Atrial fibrillation. HR RVR.       Pertinent Labs/Diagnostic Test Results:   XR chest pa & lateral   ED Interpretation by Kostas Kramer MD (12/20 1839)   No acute finding      Final Result by Aditya Villa MD (12/21 0635)      No acute cardiopulmonary disease.                  Workstation performed: [id number]           Results from last 7 days   Lab Units 12/20/23  1735   SARS-COV-2  Positive*     Results from last 7 days   Lab Units 12/21/23  0524 12/20/23  1735   WBC Thousand/uL 6.01 7.90   HEMOGLOBIN g/dL 14.4 15.5   HEMATOCRIT % 43.6 47.5   PLATELETS Thousands/uL 119* 126*   NEUTROS ABS Thousands/µL  --  6.82        Results from last 7 days   Lab Units 12/21/23  0524 12/20/23  1735   SODIUM mmol/L 139 140   POTASSIUM mmol/L 4.0 4.2   CHLORIDE mmol/L 104 105   CO2 mmol/L 30 28   ANION GAP mmol/L 5 7   BUN mg/dL 17 21   CREATININE mg/dL 0.79 0.85   EGFR ml/min/1.73sq m 84 81   CALCIUM mg/dL 9.0 9.7   MAGNESIUM mg/dL 2.5 1.8*     Results from last 7 days   Lab Units 12/21/23  0524 12/20/23  1735   AST U/L 42* 22   ALT U/L 4* 23   ALK PHOS U/L 45 52   TOTAL PROTEIN g/dL 6.3* 7.2   ALBUMIN g/dL 4.0 4.7   TOTAL BILIRUBIN mg/dL 1.07* 1.71*         Results from last 7 days   Lab Units 12/21/23  0524 12/20/23  1735   GLUCOSE RANDOM mg/dL 95 107        Results from last 7 days   Lab Units 12/20/23  1735   LACTIC ACID mmol/L 1.7        Results from last 7 days   Lab Units 12/20/23  1735   BNP pg/mL 383*        Results from last 7 days   Lab Units 12/20/23  1735   LIPASE u/L 8*        Results from last 7 days   Lab Units 12/20/23  1906   CLARITY UA  Clear   COLOR UA  Yellow   SPEC GRAV UA  1.020   PH UA  6.0   GLUCOSE UA mg/dl Negative   KETONES UA mg/dl 15 (1+)*   BLOOD UA  Small*   PROTEIN UA mg/dl Trace*   NITRITE UA  Negative   BILIRUBIN UA  Small*   UROBILINOGEN UA E.U./dl 1.0   LEUKOCYTES UA  Negative   WBC UA /hpf None Seen   RBC UA /hpf 2-4   BACTERIA UA /hpf None Seen   EPITHELIAL CELLS WET PREP /hpf Occasional     Results from last 7 days   Lab Units 12/20/23  1735   INFLUENZA A PCR  Negative   INFLUENZA B PCR  Negative   RSV PCR  Negative        Results from last 7 days   Lab Units 12/20/23  1815 12/20/23  1735   BLOOD CULTURE  Received in Microbiology Lab. Culture in Progress. Received in Microbiology Lab. Culture in Progress.          ED Treatment:   Medication Administration from 12/20/2023 1725 to 12/21/2023 1059         Date/Time Order Dose Route Action     12/20/2023 1815 EST sodium chloride 0.9 % bolus 500 mL 500 mL Intravenous New Bag     12/20/2023 1925 EST sodium chloride 0.9 % bolus 500 mL 500 mL Intravenous New Bag     12/21/2023 0822 EST apixaban (ELIQUIS) tablet 5 mg 5 mg Oral Given     12/20/2023 2222 EST apixaban (ELIQUIS) tablet 5 mg 5 mg Oral Given     12/21/2023 0822 EST ascorbic acid (VITAMIN C) tablet 500 mg 500 mg Oral Given     12/21/2023 0822 EST metoprolol tartrate (LOPRESSOR) tablet 12.5 mg 12.5 mg Oral Given     12/20/2023 2222 EST metoprolol tartrate (LOPRESSOR) tablet 12.5 mg 12.5 mg Oral Given     12/21/2023 0822 EST carbidopa-levodopa (SINEMET)  mg per tablet 2 tablet 2 tablet Oral Given     12/20/2023 2235 EST  carbidopa-levodopa (SINEMET)  mg per tablet 2 tablet 2 tablet Oral Given     12/20/2023 2222 EST montelukast (SINGULAIR) tablet 10 mg 10 mg Oral Given     12/20/2023 2222 EST tamsulosin (FLOMAX) capsule 0.4 mg 0.4 mg Oral Given     12/21/2023 0822 EST sertraline (ZOLOFT) tablet 50 mg 50 mg Oral Given     12/21/2023 0822 EST famotidine (PEPCID) tablet 20 mg 20 mg Oral Given     12/21/2023 0833 EST fluticasone (FLONASE) 50 mcg/act nasal spray 2 spray 2 spray Each Nare Given     12/21/2023 0828 EST budesonide-formoterol (SYMBICORT) 160-4.5 mcg/act inhaler 1 puff 1 puff Inhalation Given     12/20/2023 2236 EST remdesivir (Veklury) 200 mg in sodium chloride 0.9 % 290 mL IVPB 200 mg Intravenous New Bag     12/20/2023 2223 EST metoprolol (LOPRESSOR) injection 2.5 mg 2.5 mg Intravenous Given     12/21/2023 0220 EST magnesium sulfate 2 g/50 mL IVPB (premix) 2 g 2 g Intravenous New Bag            Present on Admission:   HTN (hypertension)   BPH (benign prostatic hyperplasia)      Admitting Diagnosis: none    Age/Sex: 83 y.o. male    Admission Orders: SCDs; I/S; cardiac diet    Scheduled Medications:  apixaban, 5 mg, Oral, BID  vitamin C, 500 mg, Oral, Daily  budesonide-formoterol, 1 puff, Inhalation, Daily  carbidopa-levodopa, 2 tablet, Oral, TID  famotidine, 20 mg, Oral, Daily  fluticasone, 2 spray, Each Nare, Daily  metoprolol tartrate, 12.5 mg, Oral, BID  montelukast, 10 mg, Oral, HS  pravastatin, 80 mg, Oral, Daily With Dinner  remdesivir, 100 mg, Intravenous, Q24H  sertraline, 50 mg, Oral, Daily  tamsulosin, 0.4 mg, Oral, Daily With Dinner      Continuous IV Infusions: None     PRN Meds:  acetaminophen, 650 mg, Oral, Q6H PRN  metoprolol, 2.5 mg, Intravenous, Q6H PRN        IP CONSULT TO CASE MANAGEMENT    Network Utilization Review Department  ATTENTION: Please call with any questions or concerns to [phone] and carefully listen to the prompts so that you are directed to the right person. All voicemails are  confidential.   For Discharge needs, contact Care Management DC Support Team at [phone] opt. 2  Send all requests for admission clinical reviews, approved or denied determinations and any other requests to dedicated fax number below belonging to the campus where the patient is receiving treatment. List of dedicated fax numbers for the Facilities:  FACILITY NAME UR FAX NUMBER   ADMISSION DENIALS (Administrative/Medical Necessity) [phone]   DISCHARGE SUPPORT TEAM (NETWORK) [phone]   PARENT CHILD HEALTH (Maternity/NICU/Pediatrics) [phone]   [facility] [phone]   [facility] [phone]   [facility] [phone]   [facility] [phone]   [facility] [phone]   [facility] [phone]   [facility] [phone]   [facility] [phone]   [facility] [phone]   [facility] [phone]   [facility] [phone]

## 2023-12-21 NOTE — CASE MANAGEMENT
Case Management Discharge Planning Note    Patient name Maurizio Wesley  Location ED 08/ED 08 MRN [id number]  : 1940 Date 2023       Current Admission Date: 2023  Current Admission Diagnosis:Generalized weakness   Patient Active Problem List    Diagnosis Date Noted    Ambulatory dysfunction 2023    Generalized weakness 2023    Parkinson's disease 2023    Atrial fibrillation with RVR (HCC) 2023    COVID-19 virus infection 2023    Nephrolithiasis 2021    BPH (benign prostatic hyperplasia) 2021    Asthma 2021    HTN (hypertension) 2021    Atrial flutter (HCC) 2021      LOS (days): 1  Geometric Mean LOS (GMLOS) (days): 2.5  Days to GMLOS:1.7     OBJECTIVE:  Risk of Unplanned Readmission Score: 10.42         Current admission status: Inpatient   Preferred Pharmacy:   [facility] #[id number] - [address] [address]  Phone: [phone] Fax: [phone]    Primary Care Provider: Oskar Marc MD    Primary Insurance: [employer] REP  Secondary Insurance:     DISCHARGE DETAILS:  Only available agency is [facility]. Pt agreeable for [facility]. Cm reserved hhc with [facility].

## 2023-12-21 NOTE — ASSESSMENT & PLAN NOTE
History A-fib chronically anticoagulated with Eliquis  Did not take his metoprolol earlier in the day and now RVR at time of admission  Improved with 1 dose IV metoprolol  Continue PTA metoprolol 12.5 mg twice daily  Monitor rate and rhythm

## 2023-12-21 NOTE — ASSESSMENT & PLAN NOTE
Parkinson's, resides with his wife and is normally ambulatory at home  Today had freezing episode, found to have COVID  Appreciate PT/OT/case management  Stable for discharge home with home health care.  Walker is being set up upon discharge per PT recommendations.

## 2023-12-21 NOTE — H&P
[facility]  H&P  Name: Maurizio Wesley 83 y.o. male I MRN: [id number]  Unit/Bed#: ED 08 I Date of Admission: 12/20/2023   Date of Service: 12/21/2023 I Hospital Day: 1      Assessment/Plan   * Generalized weakness  Assessment & Plan  Presented from home with ambulatory dysfunction, generalized weakness unable to ambulate prompting ED evaluation  Neurological exam nonfocal but unable to ambulate  UA negative  COVID-positive  History Parkinson's, suspect freezing episode in setting of acute infectious process  At time of admission patient is moving all extremities with equal strength  Continue supportive care  Appreciate PT/OT/case management    COVID-19 virus infection  Assessment & Plan  Episode of generalized weakness, inability to ambulate earlier in the day prompting ED evaluation  Less than 24 hours URI symptoms  Denies fever/chills/nausea/vomiting/diarrhea  COVID PCR + 12/20/2023  Not requiring supplemental oxygen  CXR without infiltrate or effusion  Remdesivir for high risk patient, wife is agreeable to treatment plan  Monitor CBC, CMP  Monitor respiratory status  Isolation precautions    Atrial fibrillation with RVR (HCC)  Assessment & Plan  History A-fib chronically anticoagulated with Eliquis  Did not take his metoprolol earlier in the day and now RVR at time of admission  Improved with 1 dose IV metoprolol  Continue PTA metoprolol 12.5 mg twice daily  Monitor rate and rhythm    Parkinson's disease  Assessment & Plan  Follows with [facility] neurology  Continue PTA Sinemet 3 times daily  Outpatient follow-up    Ambulatory dysfunction  Assessment & Plan  Parkinson's, resides with his wife and is normally ambulatory at home  Today had freezing episode, found to have COVID  Appreciate PT/OT/case management    BPH (benign prostatic hyperplasia)  Assessment & Plan  Continue Flomax  Urinary retention protocol  UA without evidence of infectious process    HTN (hypertension)  Assessment &  Plan  History hypertension  Continue PTA metoprolol tartrate 12 5 mg twice daily  Trend blood pressures           VTE Pharmacologic Prophylaxis: VTE Score: 4 High Risk (Score >/= 5) - Pharmacological DVT Prophylaxis Ordered: apixaban (Eliquis). Sequential Compression Devices Ordered.  Code Status: Level 1 - Full Code   Discussion with family: Updated  (wife) at bedside.    Anticipated Length of Stay: Patient will be admitted on an inpatient basis with an anticipated length of stay of greater than 2 midnights secondary to generalized weakness.    Chief Complaint: Weakness unable to ambulate    History of Present Illness:  Maurizio Wesley is a 83 y.o. 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  Patient was unable to ambulate and ADLs prompting wife to present to ED for further evaluation.  Neurological exam nonfocal, UA negative, patient found to have COVID-positive PCR as suspected source of generalized weakness.  Presented to the medical service for further evaluation and treatment.    Review of Systems:  Review of Systems   Constitutional:  Negative for chills and fever.   HENT:  Positive for congestion, rhinorrhea and sneezing. Negative for ear pain and sore throat.    Eyes:  Negative for pain and visual disturbance.   Respiratory:  Positive for cough. Negative for shortness of breath.    Cardiovascular:  Negative for chest pain and palpitations.   Gastrointestinal:  Negative for abdominal pain and vomiting.   Genitourinary:  Negative for dysuria and hematuria.   Musculoskeletal:  Negative for arthralgias and back pain.   Skin:  Negative for color change and rash.   Neurological:  Negative for seizures and syncope.   All other systems reviewed and are  negative.      Past Medical and Surgical History:   Past Medical History:   Diagnosis Date    Asthma     Atrial fibrillation (HCC)     Cerebral aneurysm     right carotid terminus    Chronic constipation     Closed traumatic fracture of ribs of right side with pneumothorax     Diverticulosis of colon     GERD (gastroesophageal reflux disease)     Hyperlipidemia     Hypertension     Migraines     Nephrolithiasis     Parkinson's disease     Pharyngeal dysphagia     Rectal stricture     Sleep apnea syndrome     Vitamin D deficiency        Past Surgical History:   Procedure Laterality Date    COLECTOMY  2018    CYSTOSCOPY      kidney stone retrieval    HERNIA REPAIR      JOINT REPLACEMENT Right     knee       Meds/Allergies:  Prior to Admission medications    Medication Sig Start Date End Date Taking? Authorizing Provider   carbidopa-levodopa (SINEMET)  mg per tablet Take 2 tablets by mouth 3 (three) times a day   Yes Historical Provider, MD   famotidine (PEPCID) 20 mg tablet Take 20 mg by mouth daily   Yes Historical Provider, MD   mometasone (NASONEX) 50 mcg/act nasal spray 2 sprays into each nostril daily   Yes Historical Provider, MD   sertraline (ZOLOFT) 50 mg tablet Take 50 mg by mouth daily   Yes Historical Provider, MD   apixaban (ELIQUIS) 5 mg Take 1 tablet (5 mg total) by mouth 2 (two) times a day 6/4/21 7/28/21  Ky Merino PA-C   Ascorbic Acid (VITAMIN C) 100 MG tablet Take 100 mg by mouth daily    Historical Provider, MD   budesonide-formoterol (Symbicort) 160-4.5 mcg/act inhaler Inhale 1 puff in the morning    Historical Provider, MD   calcium acetate (PHOSLO) 667 mg capsule Take 1,334 mg by mouth 3 (three) times a day with meals    Historical Provider, MD   diclofenac sodium (VOLTAREN) 1 %  4/24/19   Historical Provider, MD   metoprolol tartrate (LOPRESSOR) 25 mg tablet Take 12.5 mg by mouth 2 (two) times a day 5/13/19   Historical Provider, MD   metroNIDAZOLE (METROGEL) 0.75 % gel Apply 1  application topically 2 (two) times a day To affected area 4/24/19   Historical Provider, MD   Mirabegron ER (Myrbetriq) 50 MG TB24 Take 50 mg by mouth daily    Historical Provider, MD   montelukast (SINGULAIR) 10 mg tablet  5/1/19   Historical Provider, MD   Naproxen Sodium (ALEVE PO) Take by mouth    Historical Provider, MD   rosuvastatin (CRESTOR) 10 MG tablet Take 10 mg by mouth daily 5/6/19   Historical Provider, MD   tamsulosin (FLOMAX) 0.4 mg  5/25/19   Historical Provider, MD   UNKNOWN TO PATIENT     Historical Provider, MD     I have reviewed home medications with patient family member.    Allergies:   Allergies   Allergen Reactions    Thiopental GI Intolerance       Social History:  Marital Status: /Civil Union   Occupation: Retired  Patient Pre-hospital Living Situation: With spouse  Patient Pre-hospital Level of Mobility: unable to be assessed at time of evaluation  Patient Pre-hospital Diet Restrictions: None  Substance Use History:   Social History     Substance and Sexual Activity   Alcohol Use Not Currently    Comment: rarely     Social History     Tobacco Use   Smoking Status Never   Smokeless Tobacco Never     Social History     Substance and Sexual Activity   Drug Use Never       Family History:  History reviewed. No pertinent family history.    Physical Exam:     Vitals:   Blood Pressure: 139/80 (12/21/23 0430)  Pulse: 89 (12/21/23 0430)  Temperature: 99.6 °F (37.6 °C) (12/20/23 1733)  Temp Source: Oral (12/20/23 1733)  Respirations: 19 (12/21/23 0430)  Weight - Scale: 82.5 kg (181 lb 14.1 oz) (12/20/23 1733)  SpO2: 97 % (12/21/23 0430)    Physical Exam  Vitals and nursing note reviewed.   Constitutional:       General: He is not in acute distress.     Appearance: He is well-developed.   HENT:      Head: Normocephalic and atraumatic.      Mouth/Throat:      Mouth: Mucous membranes are dry.   Eyes:      Conjunctiva/sclera: Conjunctivae normal.   Cardiovascular:      Rate and Rhythm:  Tachycardia present. Rhythm irregular.      Heart sounds: No murmur heard.  Pulmonary:      Effort: Pulmonary effort is normal. No respiratory distress.      Breath sounds: Normal breath sounds.   Abdominal:      Palpations: Abdomen is soft.      Tenderness: There is no abdominal tenderness.   Musculoskeletal:         General: No swelling.      Cervical back: Neck supple.   Skin:     General: Skin is warm and dry.      Capillary Refill: Capillary refill takes less than 2 seconds.   Neurological:      General: No focal deficit present.      Mental Status: He is alert and oriented to person, place, and time.      Comments: Generalized weakness   Psychiatric:         Mood and Affect: Mood normal.         Behavior: Behavior normal.       Additional Data:     Lab Results:  Results from last 7 days   Lab Units 12/20/23  1735   WBC Thousand/uL 7.90   HEMOGLOBIN g/dL 15.5   HEMATOCRIT % 47.5   PLATELETS Thousands/uL 126*   NEUTROS PCT % 87*   LYMPHS PCT % 3*   MONOS PCT % 10   EOS PCT % 0     Results from last 7 days   Lab Units 12/20/23  1735   SODIUM mmol/L 140   POTASSIUM mmol/L 4.2   CHLORIDE mmol/L 105   CO2 mmol/L 28   BUN mg/dL 21   CREATININE mg/dL 0.85   ANION GAP mmol/L 7   CALCIUM mg/dL 9.7   ALBUMIN g/dL 4.7   TOTAL BILIRUBIN mg/dL 1.71*   ALK PHOS U/L 52   ALT U/L 23   AST U/L 22   GLUCOSE RANDOM mg/dL 107                 Results from last 7 days   Lab Units 12/20/23  1735   LACTIC ACID mmol/L 1.7       Lines/Drains:  Invasive Devices       Peripheral Intravenous Line  Duration             Peripheral IV 12/20/23 Left;Ventral (anterior) Forearm <1 day                        Imaging: Reviewed radiology reports from this admission including: chest xray  XR chest pa & lateral   ED Interpretation by Kostas Kramer MD (12/20 1839)   No acute finding          EKG and Other Studies Reviewed on Admission:   EKG: Atrial fibrillation. HR RVR.    ** Please Note: This note has been constructed using a voice recognition  system. **

## 2023-12-21 NOTE — ASSESSMENT & PLAN NOTE
Follows with [facility] neurology  Continue PTA Sinemet 3 times daily  Outpatient follow-up

## 2023-12-21 NOTE — ASSESSMENT & PLAN NOTE
Presented from home with ambulatory dysfunction, generalized weakness unable to ambulate prompting ED evaluation  Neurological exam nonfocal but unable to ambulate  UA negative  COVID-positive  History Parkinson's, suspect freezing episode in setting of acute infectious process  At time of admission patient is moving all extremities with equal strength  Continue supportive care  Appreciate PT/OT/case management

## 2023-12-21 NOTE — PROGRESS NOTES
Patient:    MRN:  [id number]    Leloin Request ID:  [id number]    Level of care reserved:  Home Health Agency    Partner Reserved:  [facility], [place] [address] [phone]    Clinical needs requested:  physical therapy    Geography searched:  [phone]    Start of Service:    Request sent:  1:43pm EST on 12/21/2023 by Erin Muñoz    Partner reserved:  3:02pm EST on 12/21/2023 by Erin Muñoz    Choice list shared:

## 2023-12-21 NOTE — ASSESSMENT & PLAN NOTE
Presented from home with ambulatory dysfunction, generalized weakness unable to ambulate prompting ED evaluation  Neurological exam nonfocal but unable to ambulate  UA negative  COVID-positive  History Parkinson's, suspect freezing episode in setting of acute infectious process  At time of admission patient is moving all extremities with equal strength  Continue supportive care  Appreciate PT/OT/case management  Home with home health care today.

## 2023-12-21 NOTE — OCCUPATIONAL THERAPY NOTE
Occupational Therapy Evaluation     Patient Name: Maurizio Wesley  Today's Date: 12/21/2023  Problem List  Principal Problem:    Generalized weakness  Active Problems:    HTN (hypertension)    BPH (benign prostatic hyperplasia)    Ambulatory dysfunction    Parkinson's disease    Atrial fibrillation with RVR (HCC)    COVID-19 virus infection    Past Medical History  Past Medical History:   Diagnosis Date    Asthma     Atrial fibrillation (HCC)     Cerebral aneurysm     right carotid terminus    Chronic constipation     Closed traumatic fracture of ribs of right side with pneumothorax     Diverticulosis of colon     GERD (gastroesophageal reflux disease)     Hyperlipidemia     Hypertension     Migraines     Nephrolithiasis     Parkinson's disease     Pharyngeal dysphagia     Rectal stricture     Sleep apnea syndrome     Vitamin D deficiency      Past Surgical History  Past Surgical History:   Procedure Laterality Date    COLECTOMY  2018    CYSTOSCOPY      kidney stone retrieval    HERNIA REPAIR      JOINT REPLACEMENT Right     knee          12/21/23 1020   Note Type   Note type Evaluation   Pain Assessment   Pain Assessment Tool 0-10   Pain Score 4   Pain Location/Orientation Orientation: Right;Location: Buttocks;Location: Shoulder   Restrictions/Precautions   Other Precautions Contact/isolation;Airborne/isolation;Cognitive;Fall Risk;Pain   Home Living   Type of Home House  (1-2 KALEN)   Home Layout Two level;Bed/bath upstairs;1/2 bath on main level  (FF L HR)   Bathroom Shower/Tub Tub/shower unit   Bathroom Toilet Standard   Bathroom Equipment Shower chair;Other (Comment)  (Suction bars in the shower)   Home Equipment   (denies owning DME)   Additional Comments Pt reports living in a 2SH with 1-2 KALEN and FF L HR to 2nd floor. Pt reports not using AD for functional mobility PTA.   Prior Function   Level of [place] Independent with ADLs;Independent with functional mobility;Independent with IADLS   Lives With Spouse    Receives Help From Family   IADLs Independent with driving;Independent with meal prep;Independent with medication management   Falls in the last 6 months 0  (Pt denying falls, however B knee abrasions noted)   Comments PTA, pt reports independence with ADLs, IADLs and functional mobility.   General   Additional Pertinent History Pt with hx R carotid terminus cerebral aneurysm and PD, follows with [facility] neurology.   ADL   UB Dressing Assistance 5  Supervision/Setup   LB Dressing Assistance 4  Minimal Assistance   LB Dressing Deficit Don/doff R sock;Don/doff L sock   Additional Comments Pt requiring min assist and increased time to don sock while seated EOB. LB ADLs can be completed overall with min assist. UB ADLs with setup, cues, increased time.   Bed Mobility   Supine to Sit 4  Minimal assistance   Additional items Assist x 1;Increased time required;Verbal cues   Additional Comments Pt received supine in stretcher upon start of session. Pt supine > sit EOB with min assist x 1 and increased time.   Transfers   Sit to Stand   (Steadying assist)   Additional items Increased time required;Verbal cues   Stand to Sit   (Steadying assist)   Additional items Increased time required;Verbal cues   Stand pivot 4  Minimal assistance   Additional items Assist x 1;Increased time required;Verbal cues   Additional Comments Functional transfers initially without AD. Pt sit > stand from EOB with steadying assist. Pt spt in room with min assist x 1. Mild shuffling noted. VCs for safe RW management and turning completely prior to sitting.   Functional Mobility   Functional Mobility   (Steadying assist)   Additional Comments Pt participated in functional household distance in room and hallway with steadying assist. Trialled use of RW which pt progresses to close supervision with. At end of session, pt seated in chair with call bell in reach and all needs met.   Balance   Static Sitting Good   Dynamic Sitting Fair -   Static  Standing Fair -   Dynamic Standing Poor +   Activity Tolerance   Activity Tolerance Patient limited by fatigue;Patient limited by pain;Other (Comment)  (Pt limited by decreased cognition)   Medical Staff Made Aware Kika BASS   Nurse Made Aware RNMelo Assessment   RUE Assessment WFL  (Mildly decreased AROM, still WFL. Strength grossly 3+/5 within available range.)   LUE Assessment   LUE Assessment WFL  (Strength grossly 3+/5)   Hand Function   Gross Motor Coordination Functional   Fine Motor Coordination Functional   Hand Function Comments Pt is R hand dominant.   Cognition   Overall Cognitive Status Impaired   Arousal/Participation Alert;Cooperative   Attention Within functional limits   Orientation Level Oriented to person;Oriented to place;Disoriented to time;Disoriented to situation  (Oriented to month, not year. Orients to situation with cues.)   Memory Decreased recall of recent events   Following Commands Follows one step commands with increased time or repetition   Comments Pt with intermittent confusion at times, difficulty recalling home setup and PLOF.   Assessment   Limitation Decreased ADL status;Decreased UE ROM;Decreased UE strength;Decreased Safe judgement during ADL;Decreased cognition;Decreased endurance;Decreased self-care trans;Decreased high-level ADLs;Decreased fine motor control   Assessment Pt is a 83 y.o. male, [facility] to [facility]20/[facility]23 d/t experiencing generalized weakness. Dx: Generalized weakness. Pt with PMHx impacting their performance during ADL tasks, including: COVID-19, Afib with RVR chronically anticoagulated with Eliqius, PD, ambulatory dysfunction, BPH, HTN, GERD, HLD. Prior to admission to the hospital Pt was performing ADLs without physical assistance PRN. IADLs without physical assistance. Functional transfers/ambulation without physical assistance. Cognitive status PTA was WFL. OT order placed to assess Pt's ADLs, cognitive status, and performance during functional  tasks in order to maximize safety and independence while making most appropriate d/c recommendations. PT/OT co-evaluation completed at this time d/t significant mobility deficits and safety concerns. Pt's clinical presentation is currently unstable/unpredictable given new onset deficits that affect Pt's occupational performance and ability to safely return to PLOF including decrease activity tolerance, decrease standing balance, decrease performance during ADL tasks, decrease cognition, decrease safety awareness , decrease BUE ROM, decrease UB MS, increased pain, decrease generalized strength, decrease activity engagement, decrease performance during functional transfers, steps to enter home, limited family support, high fall risk, and limited insight to deficits combined with medical complications of pain impacting overall mobility status, change in mental status, A-fib, elevated BNP, wounds, and need for input for mobility technique/safety. Personal factors affecting Pt at time of initial evaluation include: step(s) to enter environment, multi-level environment, limited home support, advanced age, inability to perform IADLs, inability to perform ADLs, new need for AD, inability to navigate community distances, limited insight into impairments, and decreased initiation and engagement. Pt will benefit from continued skilled OT services to address deficits as defined above and to maximize level independence/participation during ADLs and functional tasks to facilitate return toward PLOF and improved quality of life. From an occupational therapy standpoint, Level III (Minimum Resource Intensity) is recommended upon d/c.   Plan   Treatment Interventions ADL retraining;Functional transfer training;UE strengthening/ROM;Endurance training;Cognitive reorientation;Patient/family training;Fine motor coordination activities;Compensatory technique education;Continued evaluation;Activityengagement;Energy conservation;Equipment  evaluation/education;Neuromuscular reeducation   Goal Expiration Date 01/04/24   OT Frequency 2-3x/wk   Discharge Recommendation   Rehab Resource Intensity Level, OT III (Minimum Resource Intensity)   Additional Comments  Would benefit from increased support of spouse   AM-PAC Daily Activity Inpatient   Lower Body Dressing 3   Bathing 3   Toileting 3   Upper Body Dressing 3   Grooming 3   Eating 4   Daily Activity Raw Score 19   Daily Activity Standardized Score (Calc for Raw Score >=11) 40.22   AM-PAC Applied Cognition Inpatient   Following a Speech/Presentation 3   Understanding Ordinary Conversation 4   Taking Medications 2   Remembering Where Things Are Placed or Put Away 2   Remembering List of 4-5 Errands 3   Taking Care of Complicated Tasks 2   Applied Cognition Raw Score 16   Applied Cognition Standardized Score 35.03     The patient's raw score on the AM-PAC Daily Activity Inpatient Short Form is 19. A raw score of greater than or equal to 19 suggests the patient may benefit from discharge to home. Please refer to the recommendation of the Occupational Therapist for safe discharge planning.    Pt goals to be met by 1/4/2024:    Pt will demonstrate ability to complete grooming/hygiene tasks @ mod I after set-up.  Pt will demonstrate ability to complete supine<>sit @ mod I in order to increase safety and independence during ADL tasks.  Pt will demonstrate ability to complete UB ADLs including washing/dressing @ mod I in order to increase performance and participation during meaningful tasks  Pt will demonstrate ability to complete LB dressing @ mod I in order to increase safety and independence during meaningful tasks.   Pt will demonstrate ability to jewels/doff socks/shoes while sitting EOB/chair @ mod I in order to increase safety and independence during meaningful tasks.   Pt will demonstrate ability to complete toileting tasks including CM and pericare @ mod I in order to increase safety and independence  during meaningful tasks.  Pt will demonstrate ability to complete EOB, chair, toilet/commode transfers @ mod I in order to increase performance and participation during functional tasks.  Pt will demonstrate ability to stand for 10 minutes while maintaining F+ balance with use of RW for UB support PRN.  Pt will demonstrate ability to tolerate 30 minute OT session with no vc'ing for deep breathing or use of energy conservation techniques in order to increase activity tolerance during functional tasks.   Pt will demonstrate Good carryover of use of energy conservation/compensatory strategies during ADLs and functional tasks in order to increase safety and reduce risk for falls.   Pt will demonstrate Good attention and participation in continued evaluation of functional ambulation house hold distances in order to assist with safe d/c planning.  Pt will attend to continued cognitive assessments 100% of the time in order to provide most appropriate d/c recommendations.   Pt will follow 100% simple 2-step commands and be A&O x4 consistently with environmental cues to increase participation in functional activities.   Pt will identify 3 areas of interest/hobbies and 1 intervention on how to incorporate into daily life in order to increase interaction with environment and peers as well as increase participation in meaningful tasks.   Pt will demonstrate 100% carryover of BUE HEP in order to increase BUE MS and increase performance during functional tasks upon d/c home.    Mio Castorena MS, OTR/L

## 2023-12-21 NOTE — PHYSICAL THERAPY NOTE
PHYSICAL THERAPY EVALUATION  NAME:  Maurizio Wesley  DATE: 12/21/23    AGE:   83 y.o.  Mrn:   [id number]  ADMIT DX:  No admission diagnoses are documented for this encounter.    Past Medical History:   Diagnosis Date    Asthma     Atrial fibrillation (HCC)     Cerebral aneurysm     right carotid terminus    Chronic constipation     Closed traumatic fracture of ribs of right side with pneumothorax     Diverticulosis of colon     GERD (gastroesophageal reflux disease)     Hyperlipidemia     Hypertension     Migraines     Nephrolithiasis     Parkinson's disease     Pharyngeal dysphagia     Rectal stricture     Sleep apnea syndrome     Vitamin D deficiency      Length Of Stay: 1  Performed at least 2 patient identifiers during session: Name and Birthday  PHYSICAL THERAPY EVALUATION :    12/21/23 1019   PT Last Visit   PT Visit Date 12/21/23   Note Type   Note type Evaluation   Pain Assessment   Pain Assessment Tool 0-10   Pain Score 4   Pain Location/Orientation Orientation: Right;Location: Buttocks;Location: Shoulder   Restrictions/Precautions   Other Precautions Cognitive;Chair Alarm;Bed Alarm;Fall Risk;Pain;Hard of hearing;Airborne/isolation;Contact/isolation   Home Living   Type of Home House  (1 or 2 KALEN)   Home Layout Two level;Bed/bath upstairs;1/2 bath on main level  (FF L HR)   Bathroom Shower/Tub Tub/shower unit   Bathroom Toilet Standard   Bathroom Equipment Shower chair  (suction bars in the shower)   Home Equipment   (no AD PTA.)   Additional Comments Reports living in a 2SH with KALEN and L HR to 2nd floor. No AD PTA.   Prior Function   Level of [place] Independent with ADLs;Independent with functional mobility;Independent with IADLS   Lives With Spouse   Receives Help From Family   IADLs Independent with driving;Independent with meal prep;Independent with medication management   Falls in the last 6 months 0  (? fall, abrasions B knees)   Comments Reports being independent with mobility ADLs and IADLs.  "  General   Additional Pertinent History + Covid 19   Cognition   Orientation Level Oriented to person;Oriented to place;Disoriented to situation  (oriented to month, not year, situation with cues)   Following Commands Follows one step commands with increased time or repetition   Comments confusion, reports not usual for him, ? associated with covid   Subjective   Subjective \"I can't believe I can't think of that\" (referring to orientation)   RLE Assessment   RLE Assessment WFL  (4/5)   LLE Assessment   LLE Assessment WFL  (4/5)   Coordination   Rapid Alternating Movements Intact   Light Touch   RLE Light Touch Grossly intact   LLE Light Touch Grossly intact   Bed Mobility   Supine to Sit 4  Minimal assistance   Additional items Assist x 1;Increased time required;Verbal cues  (trunk management)   Additional Comments HOB flat without bedrail. inc time to complete, required minAx1 of trunk.   Transfers   Sit to Stand   (steadying assistance)   Additional items Assist x 1;Increased time required;Verbal cues   Stand to Sit   (steadying assistance)   Additional items Assist x 1;Increased time required;Verbal cues   Stand pivot 4  Minimal assistance  (to steadying assistance)   Additional items Assist x 1;Increased time required;Verbal cues   Additional Comments no AD. sit<>stand with wide YAHIR with inc time with steadying assistance. spt without AD with minimal to steadying assistance for balance and safe completion.   Ambulation/Elevation   Gait pattern Wide YAHIR;Decreased foot clearance;Short stride;Excessively slow;Step through pattern   Gait Assistance   (minimal to steadying assistance)   Additional items Assist x 1;Verbal cues   Assistive Device None   Distance ambulated 60'x1 without AD with steadying to minimal assistance with inc path deviation, decreased step length. min cues for inc step length and foot clearance.   Balance   Static Sitting Good   Dynamic Sitting Fair -   Static Standing Fair -   Dynamic " "Standing Poor +   Ambulatory Poor +   Endurance Deficit   Endurance Deficit Description HR 100s to 120s with Afib reading on monitor   Activity Tolerance   Activity Tolerance Patient limited by fatigue;Patient limited by pain  (decreased cognition)   Medical Staff Made Aware Tiffanie CARLOS   Nurse Made Aware Melo AMARO   Assessment   Prognosis Good   Problem List Decreased strength;Decreased endurance;Impaired balance;Decreased mobility;Decreased cognition;Decreased safety awareness;Impaired judgement;Pain   Barriers to Discharge None   Barriers to Discharge Comments pt has support from family prn   Goals   Patient Goals \"Go home\"   STG Expiration Date 01/04/24   PT Treatment Day 1   Plan   Treatment/Interventions ADL retraining;Functional transfer training;LE strengthening/ROM;Elevations;Therapeutic exercise;Endurance training;Cognitive reorientation;Patient/family training;Equipment eval/education;Bed mobility;Gait training;Compensatory technique education;Spoke to nursing;Spoke to case management;OT   PT Frequency 2-3x/wk   Discharge Recommendation   Rehab Resource Intensity Level, PT III (Minimum Resource Intensity)   Equipment Recommended Walker   Walker Package Recommended Wheeled walker   Change/add to Walker Package? No   Additional Comments assistance from family prn recommended   AM-PAC Basic Mobility Inpatient   Turning in Flat Bed Without Bedrails 3   Lying on Back to Sitting on Edge of Flat Bed Without Bedrails 3   Moving Bed to Chair 3   Standing Up From Chair Using Arms 3   Walk in Room 3   Climb 3-5 Stairs With Railing 3   Basic Mobility Inpatient Raw Score 18   Basic Mobility Standardized Score 41.05   Highest Level Of Mobility   JH-HLM Goal 6: Walk 10 steps or more   JH-HLM Achieved 8: Walk 250 feet ot more   Additional Treatment Session   Start Time 1037   End Time 1053   Treatment Assessment Pt tolerated session well. He was able to ambulate increased distances with decreasing asisstance as ambulation " distance increased. He was able to trial stairs. He c/o some confusion rearding home set up and orientaiton. He will beenfit from consistent support and asisstance from spouse at home prn. He verbalize dunderstanding for use of RW. He is limited by decreased strength, baalnce and endurance. He will continue to benefit from PT services to maximize LOF.   Equipment Use initiated use of RW. sit<>stand with RW with SBA with min cues for hand placement for safety. spt with RW with SBA wiht min cues for tunring completely prior to sitting. ambulated 80'x1 with SBA and 26'x1 and 20'x1 with RW with close supervision with min cues to stay within YAHIR of RW and inc step length. then ambulated 160' total (92' with RW with close supervision and 68'x1 without AD with SBA). min cues for direction and safety. completed 6 steps with L HR with SBA up and steadying assistance down with step to pattern. inc time to complete. discussed use of RW upon return to home, pt verbalized understanding.   End of Consult   Patient Position at End of Consult Bedside chair;All needs within reach       Pt requires PT/OT co-eval due to medical complexity, safety concerns, fall risk, significant assistance with mobility and/or cognitive-behavioral impairments.    (Please find full objective findings from PT assessment regarding body systems outlined above).     Assessment: Pt is a 83 y.o. male seen for PT evaluation s/p admission to [facility] on 12/20/2023 with Generalized weakness.  Order placed for PT services.  Upon evaluation: Pt is presenting with impaired functional mobility due to pain, decreased strength, decreased endurance, impaired balance, gait deviations, impaired cognition, decreased safety awareness, impaired judgment, fall risk, and impaired skin integrity requiring  minimal assistance for bed mobility, steadying to minimal assistance for transfers, and steadying to minimal assistance for ambulation with out AD .  Pt's clinical presentation is currently unpredictable given the functional mobility deficits above, especially weakness, decreased skin integrity, decreased endurance, impaired balance, gait deviations, pain, decreased activity tolerance, decreased functional mobility tolerance, decreased safety awareness, impaired judgement, and decreased cognition, coupled with fall risks as indicated by -PAC 6-Clicks: 18/24 as well as impaired balance, polypharmacy, impaired judgement, decreased safety awareness, decreased cognition, and questionable fall  and combined with medical complications of need for input for mobility technique/safety and abnormal BNP, platelets, + Covid-19 .  Pt's PMHx and comorbidities that may affect physical performance and progress include: A fib, asthma, HTN, Parkinson's Disease, and cerebral aneurysm, h/o right rib fractures with pneumothorax, migraines . Personal factors affecting pt at time of IE include: step(s) to enter environment, multi-level environment, advanced age, inability to perform IADLs, inability to perform ADLs, inability to navigate level surfaces without external assistance, and inability to ambulate household distances. Pt will benefit from continued skilled PT services to address deficits as defined above and to maximize level of functional mobility to facilitate return toward PLOF and improved QOL. From PT/mobility standpoint, recommendation at time of d/c would be Level III (Minimum Resource Intensity), with family and/or caregiver support, and with walker in order to reduce fall risk and maximize pt's functional independence and consistency with mobility. Recommend trial with walker next 1-2 sessions, trial with cane next 1-2 sessions, and ther ex next 1-2 sessions.       The patient's -[facility] Basic Mobility Inpatient Short Form Raw Score is 18. A Raw score of greater than 16 suggests the patient may benefit from discharge to home. Please also refer to the recommendation of  the Physical Therapist for safe discharge planning.       Goals: Pt will: Perform bed mobility tasks with modified Independent to reposition in bed and prepare for transfers. Pt will perform transfers with modified Independent to decrease burden of care, decrease risk for falls, and improve activity tolerance and prepare for ambulation. Pt will ambulate with LRAD for >/= 250' with  modified Independent  to decrease burden of care, decrease risk for falls, improve activity tolerance, and improve gait quality and to access home environment. Pt will complete 1 step with LRAD and >/= 12 steps with unilateral handrail with supervision to decrease burden of care, return to home with KALEN, return to multilevel home, decrease risk for falls, and improve activity tolerance. Pt will participate in objective balance assessment to determine baseline fall risk. Pt will participate in SSWS assessment to determine level of mobility. Pt will increase B LE strength >/= 1/2 MMT grade to facilitate functional mobility.      Kika Hernandez, PT,DPT

## 2023-12-21 NOTE — ASSESSMENT & PLAN NOTE
Episode of generalized weakness, inability to ambulate earlier in the day prompting ED evaluation  Less than 24 hours URI symptoms  Denies fever/chills/nausea/vomiting/diarrhea  COVID PCR + 12/20/2023  Not requiring supplemental oxygen  CXR without infiltrate or effusion  Remdesivir for high risk patient, wife is agreeable to treatment plan  Monitor CBC, CMP  Monitor respiratory status  Isolation precautions

## 2023-12-21 NOTE — DISCHARGE SUMMARY
[facility]  Discharge- Maurizio Wesley 1940, 83 y.o. male MRN: [id number]  Unit/Bed#: ED 08 Encounter: [id number]  Primary Care Provider: Oskar Marc MD   Date and time admitted to hospital: 12/20/2023  5:27 PM    * Generalized weakness  Assessment & Plan  Presented from home with ambulatory dysfunction, generalized weakness unable to ambulate prompting ED evaluation  Neurological exam nonfocal but unable to ambulate  UA negative  COVID-positive  History Parkinson's, suspect freezing episode in setting of acute infectious process  At time of admission patient is moving all extremities with equal strength  Continue supportive care  Appreciate PT/OT/case management  Home with home health care today.    COVID-19 virus infection  Assessment & Plan  Episode of generalized weakness, inability to ambulate earlier in the day prompting ED evaluation  Less than 24 hours URI symptoms  Denies fever/chills/nausea/vomiting/diarrhea  COVID PCR + 12/20/2023  Not requiring supplemental oxygen  CXR without infiltrate or effusion  Remdesivir for high risk patient, wife is agreeable to treatment plan respiratory status remained stable.  Stable for discharge home with maintaining isolation per COVID guidelines.    Atrial fibrillation with RVR (HCC)  Assessment & Plan  History A-fib chronically anticoagulated with Eliquis  Did not take his metoprolol earlier in the day and now RVR at time of admission  Improved with 1 dose IV metoprolol  Continue PTA metoprolol 12.5 mg twice daily  Heart rate has been well-controlled.      Parkinson's disease  Assessment & Plan  Follows with [facility] neurology  Continue PTA Sinemet 3 times daily  Outpatient follow-up    Ambulatory dysfunction  Assessment & Plan  Parkinson's, resides with his wife and is normally ambulatory at home  Today had freezing episode, found to have COVID  Appreciate PT/OT/case management  Stable for discharge home with home health care.   Walker is being set up upon discharge per PT recommendations.    BPH (benign prostatic hyperplasia)  Assessment & Plan  Continue Flomax  Urinary retention protocol  UA without evidence of infectious process    HTN (hypertension)  Assessment & Plan  History hypertension  Continue PTA metoprolol tartrate 12 5 mg twice daily  Trend blood pressures        Medical Problems       Resolved Problems  Date Reviewed: 9/18/2022   None       Discharging Physician / Practitioner: Aminah Hernández MD  PCP: Oskar Marc MD  Admission Date:   Admission Orders (From admission, onward)       Ordered        12/20/23 1928  INPATIENT ADMISSION  Once                          Discharge Date: 12/21/23    Consultations During Hospital Stay:  NA    Procedures Performed:   Chest x-ray without acute infiltrate    Significant Findings / Test Results:   NA    Incidental Findings:   NA       Test Results Pending at Discharge (will require follow up):   NA     Outpatient Tests Requested:  NA    Complications:  None     Reason for Admission: Generalized weakness    Hospital Course:   Maurizio Wesley is a 83 y.o. male patient who originally presented to the hospital on 12/20/2023 due to generalized weakness.  Was found to be COVID-positive.  Started empirically on remdesivir but did not have any symptoms.  Seen by PT OT.  Stable for discharge home with a walker with home care which was set up on discharge.  Will follow-up with primary care physician within 1 week of discharge.        Please see above list of diagnoses and related plan for additional information.     Condition at Discharge: stable    Discharge Day Visit / Exam:   Subjective: Seen and examined at bedside.  Vitals: Blood Pressure: 123/80 (12/21/23 1030)  Pulse: (!) 120 (Afib on monitor) (12/21/23 1030)  Temperature: 99.6 °F (37.6 °C) (12/20/23 1733)  Temp Source: Oral (12/20/23 1733)  Respirations: 19 (12/21/23 1000)  Weight - Scale: 82.5 kg (181 lb 14.1 oz) (12/20/23 1733)  SpO2: 95 %  (12/21/23 1000)  Exam:   Physical Exam  Constitutional:       General: He is not in acute distress.  HENT:      Head: Normocephalic.      Nose: Nose normal.      Mouth/Throat:      Mouth: Mucous membranes are moist.   Eyes:      Extraocular Movements: Extraocular movements intact.      Pupils: Pupils are equal, round, and reactive to light.   Cardiovascular:      Rate and Rhythm: Normal rate. Rhythm irregular.   Pulmonary:      Effort: Pulmonary effort is normal. No respiratory distress.      Breath sounds: Normal breath sounds.   Abdominal:      General: Abdomen is flat. Bowel sounds are normal.      Palpations: Abdomen is soft.   Musculoskeletal:      Cervical back: Neck supple.      Right lower leg: No edema.      Left lower leg: No edema.   Neurological:      General: No focal deficit present.      Mental Status: He is alert and oriented to person, place, and time. Mental status is at baseline.      Comments: No increased rigidity noted.          Discussion with Family: Attempted to update  (wife) via phone. Unable to contact.  Left voice message.    Discharge instructions/Information to patient and family:   See after visit summary for information provided to patient and family.      Provisions for Follow-Up Care:  See after visit summary for information related to follow-up care and any pertinent home health orders.      Mobility at time of Discharge:      HLM Goal achieved. Continue to encourage appropriate mobility.     Disposition:   Home with VNA Services (Reminder: Complete face to face encounter)    Planned Readmission: no     Discharge Statement:  I spent 35 minutes discharging the patient. This time was spent on the day of discharge. I had direct contact with the patient on the day of discharge. Greater than 50% of the total time was spent examining patient, answering all patient questions, arranging and discussing plan of care with patient as well as directly providing post-discharge  instructions.  Additional time then spent on discharge activities.    Discharge Medications:  See after visit summary for reconciled discharge medications provided to patient and/or family.      **Please Note: This note may have been constructed using a voice recognition system**

## 2023-12-21 NOTE — CASE MANAGEMENT
Case Management Assessment & Discharge Planning Note    Patient name Maurizio Wesley  Location ED 08/ED 08 MRN [id number]  : 1940 Date 2023       Current Admission Date: 2023  Current Admission Diagnosis:Generalized weakness   Patient Active Problem List    Diagnosis Date Noted    Ambulatory dysfunction 2023    Generalized weakness 2023    Parkinson's disease 2023    Atrial fibrillation with RVR (HCC) 2023    COVID-19 virus infection 2023    Nephrolithiasis 2021    BPH (benign prostatic hyperplasia) 2021    Asthma 2021    HTN (hypertension) 2021    Atrial flutter (HCC) 2021      LOS (days): 1  Geometric Mean LOS (GMLOS) (days): 2.5  Days to GMLOS:1.9     OBJECTIVE:    Risk of Unplanned Readmission Score: 10.62         Current admission status: Inpatient  Referral Reason: Other (CAROL michelle)    Preferred Pharmacy:   [id number] - [address] [address]  Phone: [phone] Fax: [phone]    Primary Care Provider: Oskar Marc MD    Primary Insurance: [employer] RE[employer]  Secondary Insurance:     ASSESSMENT:  Active Health Care Proxies    There are no active Health Care Proxies on file.       Advance Directives  Does patient have a Health Care POA?: Yes  Does patient have Advance Directives?: Yes  Advance Directives: Living will  Primary Contact: LupilloCharisse (Spouse)  [phone]         Readmission Root Cause  30 Day Readmission: No    Patient Information  Admitted from:: Home  Mental Status: Other (Comment) (spoke to spouse due to pt in isolation)  Assessment information provided by:: Spouse  Primary Caregiver: Self (spouse sts she does help here and there for ADLs)  Support Systems: Spouse/significant other  [place] [facility] Residence[place] [facility]  What city do you live in?: [place]  Home entry access options. Select all that apply.: Stairs  Number of steps to enter  home.: 1  Do the steps have railings?: No  Type of Current Residence: 2 story home  Upon entering residence, is there a bedroom on the main floor (no further steps)?: No  A bedroom is located on the following floor levels of residence (select all that apply):: 2nd Floor  Upon entering residence, is there a bathroom on the main floor (no further steps)?: No  Indicate which floors of current residence have a bathroom (select all the apply):: 2nd Floor  Number of steps to 2nd floor from main floor: One Flight  Living Arrangements: Lives w/ Spouse/significant other  Is patient a ?: No    Activities of Daily Living Prior to Admission  Functional Status: Independent  Completes ADLs independently?: No  Level of ADL dependence: Assistance (some assitance here and there)  Ambulates independently?: Yes  Does patient use assisted devices?: Yes  Assisted Devices (DME) used: Shower Chair  Does patient currently own DME?: Yes  What DME does the patient currently own?: Shower Chair  Does patient have a history of Outpatient Therapy (PT/OT)?: Yes (in 2014)  Does the patient have a history of Short-Term Rehab?: No  Does patient have a history of HHC?: No  Does patient currently have HHC?: No         Patient Information Continued  Income Source: SSI/SSD  Does patient have prescription coverage?: Yes  Does patient receive dialysis treatments?: No  Does patient have a history of substance abuse?: No  Does patient have a history of Mental Health Diagnosis?: Yes (medication for anixety)  Is patient receiving treatment for mental health?: Yes  Has patient received inpatient treatment related to mental health in the last 2 years?: No         Means of Transportation  Means of Transport to [facility] the [place]:: Drives Self      Housing Stability: Low Risk  (12/21/2023)    Housing Stability Vital Sign     Unable to Pay for Housing in the Last Year: No     Number of Places Lived in the Last Year: 1     Unstable Housing in the Last Year: No   Food  Insecurity: No Food Insecurity (12/21/2023)    Hunger Vital Sign     Worried About Running Out of Food in the Last Year: Never true     Ran Out of Food in the Last Year: Never true   Transportation Needs: No Transportation Needs (12/21/2023)    PRAPARE - Transportation     Lack of Transportation (Medical): No     Lack of Transportation (Non-Medical): No   Utilities: Not At Risk (12/21/2023)    [facility] Utilities     Threatened with loss of utilities: No       DISCHARGE DETAILS:  CM spoke to spouse on the phone to obtain baseline information of Pt. Pt has a hx of parkinson's as of two years ago. Pt has weakness and stiffness related to covid. Spouse indicated they will be getting a walking in shower installed with grabbers and a shower chair. Spouse states pt is mostly independent however she assist him with ADLs here and there. Pt currently does not use assistive devices to ambulate. CM will follow up with pt and spouse regarding dc needs after therapy evals and provides recommendations.     Discharge planning discussed with:: spouse  Freedom of Choice: Yes  Comments - Freedom of Choice: Spouce states they would like to wait for recomendations and discuss with treatment team  CM contacted family/caregiver?: Yes  Were Treatment Team discharge recommendations reviewed with patient/caregiver?: Yes  Did patient/caregiver verbalize understanding of patient care needs?: Yes  Were patient/caregiver advised of the risks associated with not following Treatment Team discharge recommendations?: Yes    Contacts  Patient Contacts: Charisse Wesley  Relationship to Patient:: Family  Contact Method: Phone  Phone Number: [phone]  Reason/Outcome: Discharge Planning

## 2023-12-22 ENCOUNTER — HOME CARE VISIT (OUTPATIENT)
Dept: HOME HEALTH SERVICES | Facility: HOME HEALTHCARE | Age: 83
End: 2023-12-22

## 2023-12-22 NOTE — UTILIZATION REVIEW
NOTIFICATION OF ADMISSION DISCHARGE   This is a Notification of Discharge from [facility]. Please be advised that this patient has been discharge from our facility. Below you will find the admission and discharge date and time including the patient’s disposition.   UTILIZATION REVIEW CONTACT:  Dunia Noriega  Utilization   Network Utilization Review Department  Phone: [phone] x carefully listen to the prompts. All voicemails are confidential.  Email: [email or web address]     ADMISSION INFORMATION  PRESENTATION DATE: 12/20/2023  5:27 PM  OBERVATION ADMISSION DATE:   INPATIENT ADMISSION DATE: 12/20/23  7:28 PM   DISCHARGE DATE: 12/21/2023  1:50 PM   DISPOSITION:Home with Home Health Care    Network Utilization Review Department  ATTENTION: Please call with any questions or concerns to [phone] and carefully listen to the prompts so that you are directed to the right person. All voicemails are confidential.   For Discharge needs, contact Care Management DC Support Team at [phone] opt. [phone]  Send all requests for admission clinical reviews, approved or denied determinations and any other requests to dedicated fax number below belonging to the campus where the patient is receiving treatment. List of dedicated fax numbers for the Facilities:  FACILITY NAME UR FAX NUMBER   ADMISSION DENIALS (Administrative/Medical Necessity) [phone]   DISCHARGE SUPPORT TEAM ([facility]) [phone]   PARENT CHILD HEALTH (Maternity/NICU/Pediatrics) [phone]   [facility] [phone]   [facility] [phone]   [facility] [phone]   [facility] [phone]   [facility] [phone]   [facility] [phone]   [facility] [phone]   [facility]  [facility] [phone]   [facility] [phone]   [facility] [phone]   [facility] [phone]

## 2023-12-23 ENCOUNTER — HOME CARE VISIT (OUTPATIENT)
Dept: HOME HEALTH SERVICES | Facility: HOME HEALTHCARE | Age: 83
End: 2023-12-23
Payer: COMMERCIAL

## 2023-12-23 VITALS
TEMPERATURE: 96.9 F | SYSTOLIC BLOOD PRESSURE: 132 MMHG | RESPIRATION RATE: 18 BRPM | OXYGEN SATURATION: 94 % | DIASTOLIC BLOOD PRESSURE: 72 MMHG | HEART RATE: 74 BPM

## 2023-12-23 PROCEDURE — G0299 HHS/HOSPICE OF RN EA 15 MIN: HCPCS

## 2023-12-23 NOTE — CASE COMMUNICATION
[facility] has admitted your patient to Home Health service with the following disciplines:      PT, OT and SN  Response via inbasket  Primary focus of home health care.. PULMONARY  Patient stated goals of care. REMAIN AT HOME AND BE STRONGER  Anticipated visit pattern and next visit date. 1W1. 2W2. 1W2  NEXT VISIT IS WEDS  See medication list - meds in home differ from AVS. PT IS NOT TAKING PHOSLO INSTEAD TAKING CALCIUM SUPPLEMENT  600 MG DAILY.  ALSO PT IS TAKING VIT D3 1000 IU AND ALBUTEROL PRN..  IS THIS OKAY?  Significant clinical findings. WEAKNESS, FATIGUE, DECREASE ENDURANCE  Potential barriers to goal achievement. NA  Other pertinent information. NA    Thank you for allowing us to participate in the care of your patient.

## 2023-12-24 LAB
BACTERIA BLD CULT: NORMAL
BACTERIA BLD CULT: NORMAL

## 2023-12-25 LAB
BACTERIA BLD CULT: NORMAL
BACTERIA BLD CULT: NORMAL

## 2023-12-27 ENCOUNTER — HOME CARE VISIT (OUTPATIENT)
Dept: HOME HEALTH SERVICES | Facility: HOME HEALTHCARE | Age: 83
End: 2023-12-27
Payer: COMMERCIAL

## 2023-12-27 VITALS — SYSTOLIC BLOOD PRESSURE: 128 MMHG | DIASTOLIC BLOOD PRESSURE: 66 MMHG | OXYGEN SATURATION: 96 % | HEART RATE: 76 BPM

## 2023-12-27 VITALS — TEMPERATURE: 97.3 F | OXYGEN SATURATION: 96 %

## 2023-12-27 PROCEDURE — G0299 HHS/HOSPICE OF RN EA 15 MIN: HCPCS

## 2023-12-27 PROCEDURE — G0151 HHCP-SERV OF PT,EA 15 MIN: HCPCS

## 2023-12-27 PROCEDURE — G0152 HHCP-SERV OF OT,EA 15 MIN: HCPCS

## 2023-12-27 NOTE — CASE COMMUNICATION
For informational purposes only.  No response required.    OT evaluation completed on 12/27/23.  Pt demonstrates impaired balance and increased fall risk impacting ADL's such as bathing and toileting.  Pt would benefit from skilled OT services to address the above deficits and follow up on safety equipment recommendations.  Recommended and anticipated OT visit pattern is 1 week 2.  Pt and spouse in agreement.

## 2023-12-28 ENCOUNTER — HOME CARE VISIT (OUTPATIENT)
Dept: HOME HEALTH SERVICES | Facility: HOME HEALTHCARE | Age: 83
End: 2023-12-28
Payer: COMMERCIAL

## 2023-12-29 ENCOUNTER — HOME CARE VISIT (OUTPATIENT)
Dept: HOME HEALTH SERVICES | Facility: HOME HEALTHCARE | Age: 83
End: 2023-12-29
Payer: COMMERCIAL

## 2024-01-02 ENCOUNTER — HOME CARE VISIT (OUTPATIENT)
Dept: HOME HEALTH SERVICES | Facility: HOME HEALTHCARE | Age: 84
End: 2024-01-02
Payer: COMMERCIAL

## 2024-01-02 VITALS — SYSTOLIC BLOOD PRESSURE: 128 MMHG | OXYGEN SATURATION: 95 % | DIASTOLIC BLOOD PRESSURE: 66 MMHG | HEART RATE: 90 BPM

## 2024-01-02 VITALS
OXYGEN SATURATION: 96 % | TEMPERATURE: 97.3 F | RESPIRATION RATE: 16 BRPM | DIASTOLIC BLOOD PRESSURE: 72 MMHG | HEART RATE: 74 BPM | SYSTOLIC BLOOD PRESSURE: 132 MMHG

## 2024-01-02 PROCEDURE — G0151 HHCP-SERV OF PT,EA 15 MIN: HCPCS

## 2024-01-03 ENCOUNTER — HOME CARE VISIT (OUTPATIENT)
Dept: HOME HEALTH SERVICES | Facility: HOME HEALTHCARE | Age: 84
End: 2024-01-03
Payer: COMMERCIAL

## 2024-01-03 VITALS — DIASTOLIC BLOOD PRESSURE: 62 MMHG | HEART RATE: 86 BPM | OXYGEN SATURATION: 98 % | SYSTOLIC BLOOD PRESSURE: 116 MMHG

## 2024-01-03 PROCEDURE — G0152 HHCP-SERV OF OT,EA 15 MIN: HCPCS

## 2024-01-03 NOTE — CASE COMMUNICATION
OT discipline discharge today as planned.  Pt has met his OT goals and returned to PLOF in regards to ADL's.  Pt and spouse in agreement with OT discharge.

## 2024-01-09 ENCOUNTER — TELEPHONE (OUTPATIENT)
Dept: HOME HEALTH SERVICES | Facility: HOME HEALTHCARE | Age: 84
End: 2024-01-09

## 2024-01-09 ENCOUNTER — HOME CARE VISIT (OUTPATIENT)
Dept: HOME HEALTH SERVICES | Facility: HOME HEALTHCARE | Age: 84
End: 2024-01-09
Payer: COMMERCIAL

## 2024-02-03 ENCOUNTER — HOSPITAL ENCOUNTER (EMERGENCY)
Facility: HOSPITAL | Age: 84
Discharge: HOME | End: 2024-02-03
Attending: STUDENT IN AN ORGANIZED HEALTH CARE EDUCATION/TRAINING PROGRAM
Payer: COMMERCIAL

## 2024-02-03 VITALS
DIASTOLIC BLOOD PRESSURE: 89 MMHG | SYSTOLIC BLOOD PRESSURE: 153 MMHG | HEIGHT: 70 IN | RESPIRATION RATE: 18 BRPM | TEMPERATURE: 97.6 F | WEIGHT: 174.38 LBS | HEART RATE: 97 BPM | BODY MASS INDEX: 24.97 KG/M2 | OXYGEN SATURATION: 97 %

## 2024-02-03 DIAGNOSIS — K06.8 GINGIVAL BLEEDING: Primary | ICD-10-CM

## 2024-02-03 PROCEDURE — 99284 EMERGENCY DEPT VISIT MOD MDM: CPT

## 2024-02-03 PROCEDURE — 99284 EMERGENCY DEPT VISIT MOD MDM: CPT | Performed by: STUDENT IN AN ORGANIZED HEALTH CARE EDUCATION/TRAINING PROGRAM

## 2024-02-03 RX ORDER — TRANEXAMIC ACID 100 MG/ML
500 INJECTION, SOLUTION INTRAVENOUS ONCE
Status: COMPLETED | OUTPATIENT
Start: 2024-02-03 | End: 2024-02-03

## 2024-02-03 RX ADMIN — TRANEXAMIC ACID 500 MG: 1 INJECTION, SOLUTION INTRAVENOUS at 12:46

## 2024-02-03 NOTE — DISCHARGE INSTRUCTIONS
Continue all prescribed medications, including the Eliquis.    Tylenol 1000 mg every 6 hours recommended for pain.    Eat a soft diet for the next few days. Follow up with your dentist.     Return to the ED for any concerning signs or symptoms.

## 2024-02-03 NOTE — ED PROVIDER NOTES
History  Chief Complaint   Patient presents with    Bleeding/Bruising     Per wife his mouth has been bleeding since 0330 this morning. Had tooth pulled , called on call provider and instructed to come to ED for further evaluation. Takes eliquis.        History provided by:  Patient and spouse  Mouth Injury  Location:  Tooth#14.  Quality:  Patient denies pain.  Severity:  Moderate  Onset quality:  Gradual  Duration:  10 hours  Timing:  Intermittent  Progression:  Waxing and waning  Chronicity:  New  Context:  On Eliquis. Takes NSAIDs for pain. S/p dental extraction x 8 days ago. Developed bleeding from the socket this AM. Called the dentist who recommended removal of the clot and packing. Bleeding temporarily improved with packing.  Relieved by:  Packing  Worsened by:  Nothing  Associated symptoms: no chest pain, no cough, no fatigue, no fever, no nausea, no rash, no shortness of breath and no sore throat      Prior to Admission Medications   Prescriptions Last Dose Informant Patient Reported? Taking?   Ascorbic Acid (VITAMIN C) 100 MG tablet   Yes No   Sig: Take 100 mg by mouth daily   Mirabegron ER (Myrbetriq) 50 MG TB24   Yes No   Sig: Take 50 mg by mouth daily   apixaban (ELIQUIS) 5 mg   No No   Sig: Take 1 tablet (5 mg total) by mouth 2 (two) times a day   budesonide-formoterol (Symbicort) 160-4.5 mcg/act inhaler   Yes No   Sig: Inhale 1 puff in the morning   calcium acetate (PHOSLO) 667 mg capsule   Yes No   Sig: Take 1,334 mg by mouth 3 (three) times a day with meals   carbidopa-levodopa (SINEMET)  mg per tablet   Yes No   Sig: Take 2 tablets by mouth 3 (three) times a day   famotidine (PEPCID) 20 mg tablet   Yes No   Sig: Take 20 mg by mouth daily   metoprolol tartrate (LOPRESSOR) 25 mg tablet   Yes No   Sig: Take 12.5 mg by mouth 2 (two) times a day   mometasone (NASONEX) 50 mcg/act nasal spray   Yes No   Si sprays into each nostril daily   montelukast (SINGULAIR) 10 mg tablet   Yes No    rosuvastatin (CRESTOR) 10 MG tablet   Yes No   Sig: Take 10 mg by mouth daily   sertraline (ZOLOFT) 50 mg tablet   Yes No   Sig: Take 50 mg by mouth daily   tamsulosin (FLOMAX) 0.4 mg   Yes No      Facility-Administered Medications: None       Past Medical History:   Diagnosis Date    Asthma     Atrial fibrillation (HCC)     Cerebral aneurysm     right carotid terminus    Chronic constipation     Closed traumatic fracture of ribs of right side with pneumothorax     Diverticulosis of colon     GERD (gastroesophageal reflux disease)     Hyperlipidemia     Hypertension     Migraines     Nephrolithiasis     Parkinson's disease     Pharyngeal dysphagia     Rectal stricture     Sleep apnea syndrome     Vitamin D deficiency        Past Surgical History:   Procedure Laterality Date    COLECTOMY  2018    CYSTOSCOPY      kidney stone retrieval    HERNIA REPAIR      JOINT REPLACEMENT Right     knee       History reviewed. No pertinent family history.  I have reviewed and agree with the history as documented.    E-Cigarette/Vaping    E-Cigarette Use Never User      E-Cigarette/Vaping Substances     Social History     Tobacco Use    Smoking status: Never    Smokeless tobacco: Never   Vaping Use    Vaping status: Never Used   Substance Use Topics    Alcohol use: Not Currently     Comment: rarely    Drug use: Never     Review of Systems   Constitutional:  Negative for activity change, appetite change, fatigue and fever.   HENT:  Positive for dental problem and mouth sores. Negative for facial swelling, nosebleeds, sore throat, trouble swallowing and voice change.    Respiratory:  Negative for cough, chest tightness and shortness of breath.    Cardiovascular:  Negative for chest pain.   Gastrointestinal:  Negative for nausea.   Skin:  Negative for color change, pallor, rash and wound.   Neurological:  Negative for dizziness and light-headedness.   Hematological:  Bruises/bleeds easily.   Psychiatric/Behavioral:  Negative for  confusion and decreased concentration.    All other systems reviewed and are negative.    Physical Exam  Physical Exam  Vitals and nursing note reviewed. Exam conducted with a chaperone present (Spouse is present at bedside).   Constitutional:       General: He is not in acute distress.     Appearance: He is not ill-appearing or toxic-appearing.   HENT:      Head: Normocephalic and atraumatic.      Right Ear: External ear normal.      Left Ear: External ear normal.      Nose: No congestion or rhinorrhea.      Mouth/Throat:      Pharynx: No oropharyngeal exudate or posterior oropharyngeal erythema.      Comments: Status post dental extractions of tooth #13, 14.  There is mild oozing from the tooth #14 socket.  There is noticeable clot in the socket.  Eyes:      General: No scleral icterus.        Right eye: No discharge.         Left eye: No discharge.      Extraocular Movements: Extraocular movements intact.      Conjunctiva/sclera: Conjunctivae normal.   Cardiovascular:      Rate and Rhythm: Normal rate. Rhythm irregular.      Heart sounds: Normal heart sounds. No murmur heard.  Pulmonary:      Effort: Pulmonary effort is normal.      Breath sounds: Normal breath sounds.   Musculoskeletal:      Cervical back: Neck supple. No tenderness.   Skin:     General: Skin is warm and dry.      Coloration: Skin is not pale.   Neurological:      General: No focal deficit present.      Mental Status: He is alert and oriented to person, place, and time.   Psychiatric:         Mood and Affect: Mood normal.         Behavior: Behavior normal.       Vital Signs  ED Triage Vitals [02/03/24 1224]   Temperature Pulse Respirations Blood Pressure SpO2   97.6 °F (36.4 °C) 99 16 155/90 97 %      Temp Source Heart Rate Source Patient Position - Orthostatic VS BP Location FiO2 (%)   Temporal Monitor Lying Left arm --      Pain Score       No Pain         Vitals:    02/03/24 1224 02/03/24 1511   BP: 155/90 153/89   Pulse: 99 97   Patient  Position - Orthostatic VS: Lying Lying     ED Medications  Medications   tranexamic acid 100mg/mL (for epistaxis) 500 mg (500 mg Nasal Given by Other 2/3/24 1246)     Diagnostic Studies  Results Reviewed       None               No orders to display          Procedures  Procedures     ED Course  ED Course as of 02/03/24 1529   Sat Feb 03, 2024   1241 Given the patient's history/physical exam, will remove the clot that is present in the tooth #14 socket.  Will pack the socket with TXA soaked cotton/gauze.   1255 There was superficial clot noted along the socket.  The clot was easily removed.  It appears that the sutures are intact.  There is a slow ooze from the socket.  Socket packed with TXA soaked cotton.  Will reevaluate.  No signs of respiratory distress.  Patient expresses minimal pain.   1343 Upon reevaluation, the TXA soaked cotton was removed.  Continues to have mild oozing.  A small piece of Surgicel was applied and the mouth was repacked.  Will reevaluate.   1456 Upon reevaluation, patient is able to tolerate sips of water without rebleeding.  No oozing.  The patient was instructed to continue his prescribed Eliquis.  Other recommendations/return precautions discussed.  Stable for discharge.       Medical Decision Making  This patient presents with gingival bleeding.   Diagnostic considerations include coagulopathy, epistaxis, acute blood loss anemia, dry socket. See ED Course.         Problems Addressed:  Gingival bleeding: acute illness or injury    Amount and/or Complexity of Data Reviewed  Labs: ordered. Decision-making details documented in ED Course.    Risk  Prescription drug management.      Disposition  Final diagnoses:   Gingival bleeding     Time reflects when diagnosis was documented in both MDM as applicable and the Disposition within this note       Time User Action Codes Description Comment    2/3/2024  3:02 PM Donovan Tovar Add [K06.8] Gingival bleeding           ED Disposition       ED  Disposition   Discharge    Condition   Stable    Date/Time   Sat Feb 3, 2024  3:02 PM    Comment   Maurizio Wesley discharge to home/self care.                   Follow-up Information    None         Discharge Medication List as of 2/3/2024  3:03 PM        CONTINUE these medications which have NOT CHANGED    Details   apixaban (ELIQUIS) 5 mg Take 1 tablet (5 mg total) by mouth 2 (two) times a day, Starting Fri 6/4/2021, Until Wed 7/28/2021, Normal      Ascorbic Acid (VITAMIN C) 100 MG tablet Take 100 mg by mouth daily, Historical Med      budesonide-formoterol (Symbicort) 160-4.5 mcg/act inhaler Inhale 1 puff in the morning, Historical Med      calcium acetate (PHOSLO) 667 mg capsule Take 1,334 mg by mouth 3 (three) times a day with meals, Historical Med      carbidopa-levodopa (SINEMET)  mg per tablet Take 2 tablets by mouth 3 (three) times a day, Historical Med      famotidine (PEPCID) 20 mg tablet Take 20 mg by mouth daily, Historical Med      metoprolol tartrate (LOPRESSOR) 25 mg tablet Take 12.5 mg by mouth 2 (two) times a day, Starting Mon 5/13/2019, Historical Med      Mirabegron ER (Myrbetriq) 50 MG TB24 Take 50 mg by mouth daily, Historical Med      mometasone (NASONEX) 50 mcg/act nasal spray 2 sprays into each nostril daily, Historical Med      montelukast (SINGULAIR) 10 mg tablet Starting Wed 5/1/2019, Historical Med      rosuvastatin (CRESTOR) 10 MG tablet Take 10 mg by mouth daily, Starting Mon 5/6/2019, Historical Med      sertraline (ZOLOFT) 50 mg tablet Take 50 mg by mouth daily, Historical Med      tamsulosin (FLOMAX) 0.4 mg Starting Sat 5/25/2019, Historical Med             No discharge procedures on file.    PDMP Review       None            ED Provider  Electronically Signed by             Donovan Tovar DO  02/03/24 0127

## 2024-05-31 ENCOUNTER — HOSPITAL ENCOUNTER (EMERGENCY)
Facility: HOSPITAL | Age: 84
Discharge: HOME | End: 2024-05-31
Attending: EMERGENCY MEDICINE
Payer: COMMERCIAL

## 2024-05-31 ENCOUNTER — APPOINTMENT (EMERGENCY)
Dept: RADIOLOGY | Facility: HOSPITAL | Age: 84
End: 2024-05-31
Payer: COMMERCIAL

## 2024-05-31 VITALS
HEART RATE: 83 BPM | DIASTOLIC BLOOD PRESSURE: 64 MMHG | BODY MASS INDEX: 24.91 KG/M2 | SYSTOLIC BLOOD PRESSURE: 113 MMHG | OXYGEN SATURATION: 97 % | TEMPERATURE: 97.3 F | WEIGHT: 174 LBS | HEIGHT: 70 IN | RESPIRATION RATE: 16 BRPM
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
## 2024-05-31 DIAGNOSIS — M54.50 ACUTE BILATERAL LOW BACK PAIN WITHOUT SCIATICA: ICD-10-CM

## 2024-05-31 PROCEDURE — 99283 EMERGENCY DEPT VISIT LOW MDM: CPT

## 2024-05-31 PROCEDURE — 72100 X-RAY EXAM L-S SPINE 2/3 VWS: CPT

## 2024-05-31 PROCEDURE — 99284 EMERGENCY DEPT VISIT MOD MDM: CPT | Performed by: EMERGENCY MEDICINE

## 2024-05-31 PROCEDURE — 73521 X-RAY EXAM HIPS BI 2 VIEWS: CPT

## 2024-05-31 NOTE — ED PROVIDER NOTES
[='/-==-History  Chief Complaint   Patient presents with    Fall     Pt arrives from home with c/o left lower back pain after a fall 1 week ago. Denies hitting head, no loc, +BT.      Patient is an 83-year-old male sent to the emergency department by urgent care secondary to low back pain that is been bothering him since he sustained a fall about a week ago, states he fell onto his right hip but having pain in his mid low back, denies any numbness or tingling, no head injury or loss of consciousness, no bowel or bladder dysfunction, no dysuria or hematuria        Prior to Admission Medications   Prescriptions Last Dose Informant Patient Reported? Taking?   Ascorbic Acid (VITAMIN C) 100 MG tablet   Yes No   Sig: Take 100 mg by mouth daily   Mirabegron ER (Myrbetriq) 50 MG TB24   Yes No   Sig: Take 50 mg by mouth daily   apixaban (ELIQUIS) 5 mg   No No   Sig: Take 1 tablet (5 mg total) by mouth 2 (two) times a day   budesonide-formoterol (Symbicort) 160-4.5 mcg/act inhaler   Yes No   Sig: Inhale 1 puff in the morning   calcium acetate (PHOSLO) 667 mg capsule   Yes No   Sig: Take 1,334 mg by mouth 3 (three) times a day with meals   carbidopa-levodopa (SINEMET)  mg per tablet   Yes No   Sig: Take 2 tablets by mouth 3 (three) times a day   famotidine (PEPCID) 20 mg tablet   Yes No   Sig: Take 20 mg by mouth daily   metoprolol tartrate (LOPRESSOR) 25 mg tablet   Yes No   Sig: Take 12.5 mg by mouth 2 (two) times a day   mometasone (NASONEX) 50 mcg/act nasal spray   Yes No   Si sprays into each nostril daily   montelukast (SINGULAIR) 10 mg tablet   Yes No   rosuvastatin (CRESTOR) 10 MG tablet   Yes No   Sig: Take 10 mg by mouth daily   sertraline (ZOLOFT) 50 mg tablet   Yes No   Sig: Take 50 mg by mouth daily   tamsulosin (FLOMAX) 0.4 mg   Yes No      Facility-Administered Medications: None       Past Medical History:   Diagnosis Date    Asthma     Atrial fibrillation (HCC)     Cerebral aneurysm     right carotid  terminus    Chronic constipation     Closed traumatic fracture of ribs of right side with pneumothorax     Diverticulosis of colon     GERD (gastroesophageal reflux disease)     Hyperlipidemia     Hypertension     Migraines     Nephrolithiasis     Parkinson's disease     Pharyngeal dysphagia     Rectal stricture     Sleep apnea syndrome     Vitamin D deficiency        Past Surgical History:   Procedure Laterality Date    COLECTOMY  2018    CYSTOSCOPY      kidney stone retrieval    HERNIA REPAIR      JOINT REPLACEMENT Right     knee       History reviewed. No pertinent family history.  I have reviewed and agree with the history as documented.    E-Cigarette/Vaping    E-Cigarette Use Never User      E-Cigarette/Vaping Substances     Social History     Tobacco Use    Smoking status: Never    Smokeless tobacco: Never   Vaping Use    Vaping status: Never Used   Substance Use Topics    Alcohol use: Not Currently     Comment: rarely    Drug use: Never       Review of Systems   Constitutional: Negative.    HENT: Negative.     Eyes: Negative.    Respiratory: Negative.     Cardiovascular: Negative.    Gastrointestinal: Negative.    Endocrine: Negative.    Genitourinary: Negative.    Musculoskeletal:  Positive for back pain.   Skin: Negative.    Allergic/Immunologic: Negative.    Neurological: Negative.    Hematological: Negative.    Psychiatric/Behavioral: Negative.         Physical Exam  Physical Exam  Constitutional:       Appearance: He is well-developed.   HENT:      Head: Normocephalic and atraumatic.   Eyes:      Conjunctiva/sclera: Conjunctivae normal.      Pupils: Pupils are equal, round, and reactive to light.   Cardiovascular:      Rate and Rhythm: Normal rate.   Pulmonary:      Effort: Pulmonary effort is normal.   Abdominal:      Palpations: Abdomen is soft.   Musculoskeletal:         General: Normal range of motion.      Cervical back: Normal range of motion and neck supple.      Comments: No midline or  paraspinal tenderness, no tenderness in the hips or pain with range of motion of lower extremities   Skin:     General: Skin is warm and dry.   Neurological:      Mental Status: He is alert and oriented to person, place, and time.         Vital Signs  ED Triage Vitals [05/31/24 1539]   Temperature Pulse Respirations Blood Pressure SpO2   (!) 97.3 °F (36.3 °C) 76 16 108/79 97 %      Temp Source Heart Rate Source Patient Position - Orthostatic VS BP Location FiO2 (%)   Temporal Monitor Sitting Right arm --      Pain Score       1           Vitals:    05/31/24 1539 05/31/24 1600 05/31/24 1645   BP: 108/79 104/65 113/64   Pulse: 76 76 83   Patient Position - Orthostatic VS: Sitting Sitting Sitting         Visual Acuity      ED Medications  Medications - No data to display    Diagnostic Studies  Results Reviewed       None                   XR spine lumbar 2 or 3 views injury   ED Interpretation by Griselda Watt DO (05/31 2240)   Chronic degenerative changes      XR hips bilateral 2 vw w pelvis if performed   ED Interpretation by Griselda Watt DO (05/31 2240)   No acute findings                 Procedures  Procedures         ED Course                               SBIRT 20yo+      Flowsheet Row Most Recent Value   Initial Alcohol Screen: US AUDIT-C     1. How often do you have a drink containing alcohol? 0 Filed at: 05/31/2024 1623   2. How many drinks containing alcohol do you have on a typical day you are drinking?  0 Filed at: 05/31/2024 1623   3b. FEMALE Any Age, or MALE 65+: How often do you have 4 or more drinks on one occassion? 0 Filed at: 05/31/2024 1623   Audit-C Score 0 Filed at: 05/31/2024 1623   SHELBY: How many times in the past year have you...    Used an illegal drug or used a prescription medication for non-medical reasons? Never Filed at: 05/31/2024 1623                      Medical Decision Making  Patient presents with low back pain most likely consistent with lumbar radiculopathy.  Differential  diagnosis includes lumbago versus musculoskeletal spasm/sprain versus sciatica.  No back pain red flags on history or physical.  Presentation not consistent with malignancy, fracture, cauda equina syndrome, AAA, viscus perforation, osteomyelitis or epidural abscess, renal colic, pyelonephritis or other infectious process.  Given the clinical picture, no indication for further imaging at this time.      Problems Addressed:  Acute bilateral low back pain without sciatica: acute illness or injury  Fall, initial encounter: acute illness or injury    Amount and/or Complexity of Data Reviewed  Radiology: ordered and independent interpretation performed. Decision-making details documented in ED Course.    Risk  OTC drugs.             Disposition  Final diagnoses:   Fall, initial encounter   Acute bilateral low back pain without sciatica     Time reflects when diagnosis was documented in both MDM as applicable and the Disposition within this note       Time User Action Codes Description Comment    5/31/2024  4:52 PM Griselda Watt [W19.XXXA] Fall, initial encounter     5/31/2024  4:52 PM Griselda Watt [M54.50] Acute bilateral low back pain without sciatica           ED Disposition       ED Disposition   Discharge    Condition   Stable    Date/Time   Fri May 31, 2024 1652    Comment   Maurizio Wesley discharge to home/self care.                   Follow-up Information       Follow up With Specialties Details Why Contact Info    Zackary Melendez MD Pain Medicine, Interventional Radiology In 1 week  [address] [facility]  [address] [place] [address] [id number] [phone]              Discharge Medication List as of 5/31/2024  4:52 PM        CONTINUE these medications which have NOT CHANGED    Details   apixaban (ELIQUIS) 5 mg Take 1 tablet (5 mg total) by mouth 2 (two) times a day, Starting Fri 6/4/2021, Until Wed 7/28/2021, Normal      Ascorbic Acid (VITAMIN C) 100 MG tablet Take 100 mg by mouth daily, Historical Med       budesonide-formoterol (Symbicort) 160-4.5 mcg/act inhaler Inhale 1 puff in the morning, Historical Med      calcium acetate (PHOSLO) 667 mg capsule Take 1,334 mg by mouth 3 (three) times a day with meals, Historical Med      carbidopa-levodopa (SINEMET)  mg per tablet Take 2 tablets by mouth 3 (three) times a day, Historical Med      famotidine (PEPCID) 20 mg tablet Take 20 mg by mouth daily, Historical Med      metoprolol tartrate (LOPRESSOR) 25 mg tablet Take 12.5 mg by mouth 2 (two) times a day, Starting Mon 5/13/2019, Historical Med      Mirabegron ER (Myrbetriq) 50 MG TB24 Take 50 mg by mouth daily, Historical Med      mometasone (NASONEX) 50 mcg/act nasal spray 2 sprays into each nostril daily, Historical Med      montelukast (SINGULAIR) 10 mg tablet Starting Wed 5/1/2019, Historical Med      rosuvastatin (CRESTOR) 10 MG tablet Take 10 mg by mouth daily, Starting Mon 5/6/2019, Historical Med      sertraline (ZOLOFT) 50 mg tablet Take 50 mg by mouth daily, Historical Med      tamsulosin (FLOMAX) 0.4 mg Starting Sat 5/25/2019, Historical Med                 PDMP Review       None            ED Provider  Electronically Signed by             Griselda Watt DO  05/31/24 0232

## 2024-08-05 ENCOUNTER — APPOINTMENT (EMERGENCY)
Dept: CT IMAGING | Facility: HOSPITAL | Age: 84
End: 2024-08-05
Payer: COMMERCIAL

## 2024-08-05 ENCOUNTER — HOSPITAL ENCOUNTER (EMERGENCY)
Facility: HOSPITAL | Age: 84
Discharge: HOME | End: 2024-08-05
Attending: EMERGENCY MEDICINE
Payer: COMMERCIAL

## 2024-08-05 VITALS
SYSTOLIC BLOOD PRESSURE: 158 MMHG | HEART RATE: 87 BPM | WEIGHT: 174 LBS | HEIGHT: 70 IN | TEMPERATURE: 97.5 F | OXYGEN SATURATION: 100 % | BODY MASS INDEX: 24.91 KG/M2 | DIASTOLIC BLOOD PRESSURE: 82 MMHG | RESPIRATION RATE: 18 BRPM

## 2024-08-05 DIAGNOSIS — R41.82 ALTERED MENTAL STATUS: Primary | ICD-10-CM

## 2024-08-05 LAB
ALBUMIN SERPL BCG-MCNC: 4.6 G/DL (ref 3.5–5)
ALP SERPL-CCNC: 64 U/L (ref 34–104)
ALT SERPL W P-5'-P-CCNC: 7 U/L (ref 7–52)
ANION GAP SERPL CALCULATED.3IONS-SCNC: 8 MMOL/L (ref 4–13)
AST SERPL W P-5'-P-CCNC: 21 U/L (ref 13–39)
BASOPHILS # BLD AUTO: 0.02 THOUSANDS/ÂΜL (ref 0–0.1)
BASOPHILS NFR BLD AUTO: 0 % (ref 0–1)
BILIRUB SERPL-MCNC: 0.96 MG/DL (ref 0.2–1)
BILIRUB UR QL STRIP: NEGATIVE
BUN SERPL-MCNC: 25 MG/DL (ref 5–25)
CALCIUM SERPL-MCNC: 10.1 MG/DL (ref 8.4–10.2)
CHLORIDE SERPL-SCNC: 106 MMOL/L (ref 96–108)
CLARITY UR: CLEAR
CO2 SERPL-SCNC: 30 MMOL/L (ref 21–32)
COLOR UR: YELLOW
CREAT SERPL-MCNC: 0.91 MG/DL (ref 0.6–1.3)
EOSINOPHIL # BLD AUTO: 0.03 THOUSAND/ÂΜL (ref 0–0.61)
EOSINOPHIL NFR BLD AUTO: 0 % (ref 0–6)
ERYTHROCYTE [DISTWIDTH] IN BLOOD BY AUTOMATED COUNT: 12.7 % (ref 11.6–15.1)
GFR SERPL CREATININE-BSD FRML MDRD: 78 ML/MIN/1.73SQ M
GLUCOSE SERPL-MCNC: 99 MG/DL (ref 65–140)
GLUCOSE UR STRIP-MCNC: NEGATIVE MG/DL
HCT VFR BLD AUTO: 45.3 % (ref 36.5–49.3)
HGB BLD-MCNC: 14.5 G/DL (ref 12–17)
HGB UR QL STRIP.AUTO: NEGATIVE
IMM GRANULOCYTES # BLD AUTO: 0.02 THOUSAND/UL (ref 0–0.2)
IMM GRANULOCYTES NFR BLD AUTO: 0 % (ref 0–2)
KETONES UR STRIP-MCNC: ABNORMAL MG/DL
LEUKOCYTE ESTERASE UR QL STRIP: NEGATIVE
LYMPHOCYTES # BLD AUTO: 1.03 THOUSANDS/ÂΜL (ref 0.6–4.47)
LYMPHOCYTES NFR BLD AUTO: 13 % (ref 14–44)
MCH RBC QN AUTO: 30.9 PG (ref 26.8–34.3)
MCHC RBC AUTO-ENTMCNC: 32 G/DL (ref 31.4–37.4)
MCV RBC AUTO: 96 FL (ref 82–98)
MONOCYTES # BLD AUTO: 0.7 THOUSAND/ÂΜL (ref 0.17–1.22)
MONOCYTES NFR BLD AUTO: 9 % (ref 4–12)
NEUTROPHILS # BLD AUTO: 6.18 THOUSANDS/ÂΜL (ref 1.85–7.62)
NEUTS SEG NFR BLD AUTO: 78 % (ref 43–75)
NITRITE UR QL STRIP: NEGATIVE
NRBC BLD AUTO-RTO: 0 /100 WBCS
PH UR STRIP.AUTO: 5.5 [PH]
PLATELET # BLD AUTO: 180 THOUSANDS/UL (ref 149–390)
PMV BLD AUTO: 10.6 FL (ref 8.9–12.7)
POTASSIUM SERPL-SCNC: 4.6 MMOL/L (ref 3.5–5.3)
PROT SERPL-MCNC: 7.7 G/DL (ref 6.4–8.4)
PROT UR STRIP-MCNC: NEGATIVE MG/DL
RBC # BLD AUTO: 4.7 MILLION/UL (ref 3.88–5.62)
SODIUM SERPL-SCNC: 144 MMOL/L (ref 135–147)
SP GR UR STRIP.AUTO: >=1.03 (ref 1–1.03)
TSH SERPL DL<=0.05 MIU/L-ACNC: 0.94 UIU/ML (ref 0.45–4.5)
UROBILINOGEN UR QL STRIP.AUTO: 1 E.U./DL
WBC # BLD AUTO: 7.98 THOUSAND/UL (ref 4.31–10.16)

## 2024-08-05 PROCEDURE — 36415 COLL VENOUS BLD VENIPUNCTURE: CPT | Performed by: PHYSICIAN ASSISTANT

## 2024-08-05 PROCEDURE — 84443 ASSAY THYROID STIM HORMONE: CPT | Performed by: PHYSICIAN ASSISTANT

## 2024-08-05 PROCEDURE — 99284 EMERGENCY DEPT VISIT MOD MDM: CPT | Performed by: PHYSICIAN ASSISTANT

## 2024-08-05 PROCEDURE — 99285 EMERGENCY DEPT VISIT HI MDM: CPT

## 2024-08-05 PROCEDURE — 70450 CT HEAD/BRAIN W/O DYE: CPT

## 2024-08-05 PROCEDURE — 81003 URINALYSIS AUTO W/O SCOPE: CPT | Performed by: PHYSICIAN ASSISTANT

## 2024-08-05 PROCEDURE — 80053 COMPREHEN METABOLIC PANEL: CPT | Performed by: PHYSICIAN ASSISTANT

## 2024-08-05 PROCEDURE — 85025 COMPLETE CBC W/AUTO DIFF WBC: CPT | Performed by: PHYSICIAN ASSISTANT

## 2024-08-06 NOTE — DISCHARGE INSTRUCTIONS
[facility]  eMERGENCY dEPARTMENT eNCOUnter          CHIEF COMPLAINT       Chief Complaint   Patient presents with    Immunizations       Nurses Notes reviewed and I agree except as noted inthe HPI. HISTORY OF PRESENT ILLNESS    Jennifer Strong is a 27 y.o. female who presents to the Emergency Department for the evaluation of rabies vaccine. Patient was seen in the ED 2 weeks ago for infection after a cat bite. She was admitted for IV antibiotics and rabies prophylaxis was initiated at that time. States her wound is almost healed and she is feeling substantially better but has been instructed to come back to the ED for each of her rabies vaccinations and is due today for her fourth vaccine as she is now 14 days out from the onset of the series. No other complaints at this time. The HPI was provided by the patient. REVIEW OF SYSTEMS     Review of Systems   All other systems reviewed and are negative. PAST MEDICAL HISTORY    has a past medical history of Anxiety, Asthma, Bipolar affective ([address] Cerebral laceration and contusion, concussion, without loss of consciousness, subsequent encounter, Concussion with no loss of consciousness, COVID-19, Depression, Gastritis, Iron deficiency, Iron deficiency anemia due to chronic blood loss, Ovary removal, prophylactic, and Personal history of other infectious and parasitic diseases. SURGICAL HISTORY      has a past surgical history that includes cyst removal; Breast lumpectomy; laparoscopy (Right, 5/20/2021); and Ovary removal (Right).     CURRENT MEDICATIONS       Discharge Medication List as of 11/23/2022  4:31 PM        CONTINUE these medications which have NOT CHANGED    Details   cyclobenzaprine (FLEXERIL) 10 MG tablet Take 10 mg by mouth 3 times daily as needed for Muscle spasmsHistorical Med      fluticasone (FLONASE) 50 MCG/ACT nasal spray SPRAY 2 SPRAYS INTO EACH NOSTRIL EVERY DAY, Disp-1 each, R-1Normal      albuterol sulfate Please call your neurologist tomorrow morning.  Please return to the emergency department any new or worsening symptoms.  At this point continue with current medication regimen   HFA (PROVENTIL;VENTOLIN;PROAIR) 108 (90 Base) MCG/ACT inhaler Inhale 2 puffs into the lungs every 4 hours as needed for Wheezing or Shortness of Breath, Disp-18 g, R-3Normal      gabapentin (NEURONTIN) 300 MG capsule Take 1 capsule by mouth 3 times daily for 30 days. , Disp-90 capsule, R-0Normal      metoclopramide (REGLAN) 10 MG tablet Take 1 tablet by mouth 3 times daily (with meals), Disp-120 tablet, R-3Normal      sennosides-docusate sodium (SENOKOT-S) 8.6-50 MG tablet Take 1 tablet by mouth daily, Disp-30 tablet, R-1Normal      ferrous gluconate (FERGON) 324 (38 Fe) MG tablet Take 1 tablet by mouth daily (with breakfast), Disp-30 tablet, R-3Normal      naproxen (NAPROSYN) 500 MG tablet Take 1 tablet by mouth 2 times daily (with meals) for 15 days, Disp-30 tablet, R-0Normal      OXcarbazepine (TRILEPTAL) 600 MG tablet TAKE 1 TABLET BY MOUTH IN THE MORNING AND 1 TABLET BEFORE BEDTIME., Disp-60 tablet, R-5Normal      ferrous sulfate (IRON 325) 325 (65 Fe) MG tablet Take 1 tablet by mouth daily (with breakfast), Disp-90 tablet, R-1Normal      FLUoxetine (PROZAC) 40 MG capsule Take 1 capsule by mouth daily, Disp-90 capsule, R-1Normal      ondansetron (ZOFRAN ODT) 4 MG disintegrating tablet Take 1 tablet by mouth every 8 hours as needed for Nausea or Vomiting, Disp-90 tablet, R-1Normal      montelukast (SINGULAIR) 10 MG tablet TAKE 1 TABLET BY MOUTH EVERY DAY, Disp-90 tablet, R-1Normal      budesonide-formoterol (SYMBICORT) 160-4.5 MCG/ACT AERO TAKE 2 PUFFS BY MOUTH TWICE A DAY, Disp-10.2 each, R-3Normal      loratadine (CLARITIN) 10 MG tablet Take 1 tablet by mouth daily, Disp-90 tablet, R-1Normal      Nebulizers (COMP AIR COMPRESSOR NEBULIZER) MISC Starting Wed 12/8/2021, Historical Med      Misc.  Devices (PULSE OXIMETER FOR FINGER) MISC Check at least two times per day or when feeling short of breath., Disp-1 each, R-0Normal      albuterol (PROVENTIL) (2.5 MG/3ML) 0.083% nebulizer solution Take 3 mLs by nebulization every 4 hours as needed for Wheezing or Shortness of Breath, Disp-120 mL, R-3Normal      Respiratory Therapy Supplies (NEBULIZER/TUBING/MOUTHPIECE) KIT Disp-1 kit, R-0, NormalUse with nebulizer machine      sodium chloride (ALTAMIST SPRAY) 0.65 % nasal spray 1 spray by Nasal route as needed for Congestion, Disp-1 each, R-3Normal             ALLERGIES     is allergic to latex, lisdexamfetamine, tramadol, nabumetone, norco [hydrocodone-acetaminophen], procaine, and seasonal.    FAMILY HISTORY     She indicated that her mother is alive. She indicated that her father is alive. family history is not on file. SOCIAL HISTORY      reports that she has been smoking e-cigarettes. She has never used smokeless tobacco. She reports current alcohol use. She reports that she does not currently use drugs after having used the following drugs: Marijuana Alisa Ege). PHYSICAL EXAM     INITIAL VITALS:  oral temperature is 97.8 °F (36.6 °C). Her blood pressure is 121/80 and her pulse is 86. Her respiration is 14 and oxygen saturation is 99%. Physical Exam  Vitals and nursing note reviewed. Constitutional:       Appearance: Normal appearance. HENT:      Head: Normocephalic. Eyes:      Conjunctiva/sclera: Conjunctivae normal.   Cardiovascular:      Rate and Rhythm: Normal rate. Pulmonary:      Effort: Pulmonary effort is normal. No respiratory distress. Skin:     General: Skin is warm and dry. Neurological:      General: No focal deficit present.    Psychiatric:         Mood and Affect: Mood normal.       DIFFERENTIAL DIAGNOSIS:   Differential diagnoses are discussed    DIAGNOSTIC RESULTS     EKG: All EKG's are interpreted by the Emergency Department Physician who either signs or Co-signsthis chart in the absence of a cardiologist.        RADIOLOGY: non-plain film images(s) such as CT, Ultrasound and MRI are read by the radiologist.    No orders to display       LABS:    Labs Reviewed - No data to display    EMERGENCY DEPARTMENT COURSE:   Vitals:    Vitals:    11/23/22 1528   BP: 121/80   Pulse: 86   Resp: 14   Temp: 97.8 °F (36.6 °C)   TempSrc: Oral   SpO2: 99%      3:43 PM EST: The patient was seen and evaluated. Patient presents with reassuring vital signs and exam for final dose of rabies vaccination to complete her series. No other complaints. Dose was provided and patient denied further needs. CRITICAL CARE:   None     CONSULTS:  None    PROCEDURES:  None    FINAL IMPRESSION      1.  Need for prophylactic vaccination and inoculation against rabies          DISPOSITION/PLAN   Discharge    PATIENT REFERRED TO:  [facility] [address] [address] EMERGENCY DEPT  [address]  [phone]    As needed    DISCHARGEMEDICATIONS:  Discharge Medication List as of 11/23/2022  4:31 PM          (Please note that portions of this note were completedwith a voice recognition program.  Efforts were made to edit the dictations but occasionally words are mis-transcribed.)        Destini Mahmood PA-C  11/23/22 3401

## 2024-08-06 NOTE — ED PROVIDER NOTES
History  Chief Complaint   Patient presents with    Altered Mental Status     Pt brought in by [place] police after he requested them to be called because he believes his wife is having an affair and is trying to have him kidnaped. Pt admitted to having altered mental status police reports that pt is diagnosis with parkinson's and dementia. Follows primarily with [facility].      83-year-old male presents the emergency department via [employer] police after patient had an episode where he became concerned his wife was trying to kidnap him.  Patient with a recent diagnosis of dementia has been following with [place] [facility] for similar episodes of confusion.  Wife also states she feels that patient has sundowning.  Per wife patient is on  nuplazid on which was started 8/1. Also on aricept and weaning off seroquel.  Patient states he does remember being confused in the car with his wife states he had thought there was other people there with him. Wife states this is similar to his previous episodes of confusion.  Now patient appears to be at his baseline per both patient and spouse. He is awake, alert and oriented.         Prior to Admission Medications   Prescriptions Last Dose Informant Patient Reported? Taking?   Ascorbic Acid (VITAMIN C) 100 MG tablet   Yes No   Sig: Take 100 mg by mouth daily   Mirabegron ER (Myrbetriq) 50 MG TB24   Yes No   Sig: Take 50 mg by mouth daily   apixaban (ELIQUIS) 5 mg   No No   Sig: Take 1 tablet (5 mg total) by mouth 2 (two) times a day   budesonide-formoterol (Symbicort) 160-4.5 mcg/act inhaler   Yes No   Sig: Inhale 1 puff in the morning   calcium acetate (PHOSLO) 667 mg capsule   Yes No   Sig: Take 1,334 mg by mouth 3 (three) times a day with meals   carbidopa-levodopa (SINEMET)  mg per tablet   Yes No   Sig: Take 2 tablets by mouth 3 (three) times a day   famotidine (PEPCID) 20 mg tablet   Yes No   Sig: Take 20 mg by mouth daily   metoprolol tartrate (LOPRESSOR) 25  mg tablet   Yes No   Sig: Take 12.5 mg by mouth 2 (two) times a day   mometasone (NASONEX) 50 mcg/act nasal spray   Yes No   Si sprays into each nostril daily   montelukast (SINGULAIR) 10 mg tablet   Yes No   rosuvastatin (CRESTOR) 10 MG tablet   Yes No   Sig: Take 10 mg by mouth daily   sertraline (ZOLOFT) 50 mg tablet   Yes No   Sig: Take 50 mg by mouth daily   tamsulosin (FLOMAX) 0.4 mg   Yes No      Facility-Administered Medications: None       Past Medical History:   Diagnosis Date    Asthma     Atrial fibrillation (HCC)     Cerebral aneurysm     right carotid terminus    Chronic constipation     Closed traumatic fracture of ribs of right side with pneumothorax     Diverticulosis of colon     GERD (gastroesophageal reflux disease)     Hyperlipidemia     Hypertension     Migraines     Nephrolithiasis     Parkinson's disease     Pharyngeal dysphagia     Rectal stricture     Sleep apnea syndrome     Vitamin D deficiency        Past Surgical History:   Procedure Laterality Date    COLECTOMY  2018    CYSTOSCOPY      kidney stone retrieval    HERNIA REPAIR      JOINT REPLACEMENT Right     knee       History reviewed. No pertinent family history.  I have reviewed and agree with the history as documented.    E-Cigarette/Vaping    E-Cigarette Use Never User      E-Cigarette/Vaping Substances     Social History     Tobacco Use    Smoking status: Never    Smokeless tobacco: Never   Vaping Use    Vaping status: Never Used   Substance Use Topics    Alcohol use: Not Currently     Comment: rarely    Drug use: Never       Review of Systems   Constitutional: Negative.    Respiratory: Negative.     Cardiovascular: Negative.    Musculoskeletal: Negative.    Skin: Negative.    Neurological: Negative.    Psychiatric/Behavioral:  Positive for confusion.    All other systems reviewed and are negative.      Physical Exam  Physical Exam  Vitals and nursing note reviewed.   Constitutional:       General: He is not in acute  distress.     Appearance: He is well-developed. He is not ill-appearing, toxic-appearing or diaphoretic.   HENT:      Head: Normocephalic.      Mouth/Throat:      Mouth: Mucous membranes are moist.   Eyes:      Extraocular Movements: Extraocular movements intact.      Pupils: Pupils are equal, round, and reactive to light.   Cardiovascular:      Rate and Rhythm: Normal rate and regular rhythm.   Pulmonary:      Effort: Pulmonary effort is normal.      Breath sounds: Normal breath sounds.   Abdominal:      General: Bowel sounds are normal.      Palpations: Abdomen is soft.   Musculoskeletal:         General: Normal range of motion.      Cervical back: Normal range of motion.   Skin:     General: Skin is warm and dry.   Neurological:      General: No focal deficit present.      Mental Status: He is alert and oriented to person, place, and time.      GCS: GCS eye subscore is 4. GCS verbal subscore is 5. GCS motor subscore is 6.      Cranial Nerves: No facial asymmetry.      Sensory: Sensation is intact.      Motor: No weakness.      Gait: Gait is intact.         Vital Signs  ED Triage Vitals   Temperature Pulse Respirations Blood Pressure SpO2   08/05/24 1736 08/05/24 1736 08/05/24 1736 08/05/24 1736 08/05/24 1736   (!) 97.3 °F (36.3 °C) 63 18 (!) 157/109 98 %      Temp Source Heart Rate Source Patient Position - Orthostatic VS BP Location FiO2 (%)   08/05/24 1736 08/05/24 1736 08/05/24 1800 08/05/24 1736 --   Temporal Monitor Sitting Left arm       Pain Score       08/05/24 1754       No Pain           Vitals:    08/05/24 1800 08/05/24 1830 08/05/24 1900 08/05/24 2029   BP: 151/95 163/78 134/82 158/82   Pulse: 91 86 72 87   Patient Position - Orthostatic VS: Sitting Sitting Sitting Sitting         Visual Acuity      ED Medications  Medications - No data to display    Diagnostic Studies  Results Reviewed       Procedure Component Value Units Date/Time    TSH, 3rd generation with Free T4 reflex [[id number]]  (Normal)  Collected: 08/05/24 1755    Lab Status: Final result Specimen: Blood from Arm, Left Updated: 08/05/24 2015     TSH 3RD GENERATON 0.936 uIU/mL     UA w Reflex to Microscopic w Reflex to Culture [[id number]]  (Abnormal) Collected: 08/05/24 2000    Lab Status: Final result Specimen: Urine, Clean Catch Updated: 08/05/24 2007     Color, UA Yellow     Clarity, UA Clear     Specific Gravity, UA >=1.030     pH, UA 5.5     Leukocytes, UA Negative     Nitrite, UA Negative     Protein, UA Negative mg/dl      Glucose, UA Negative mg/dl      Ketones, UA Trace mg/dl      Urobilinogen, UA 1.0 E.U./dl      Bilirubin, UA Negative     Occult Blood, UA Negative    Comprehensive metabolic panel [[id number]] Collected: 08/05/24 1755    Lab Status: Final result Specimen: Blood from Arm, Left Updated: 08/05/24 1819     Sodium 144 mmol/L      Potassium 4.6 mmol/L      Chloride 106 mmol/L      CO2 30 mmol/L      ANION GAP 8 mmol/L      BUN 25 mg/dL      Creatinine 0.91 mg/dL      Glucose 99 mg/dL      Calcium 10.1 mg/dL      AST 21 U/L      ALT 7 U/L      Alkaline Phosphatase 64 U/L      Total Protein 7.7 g/dL      Albumin 4.6 g/dL      Total Bilirubin 0.96 mg/dL      eGFR 78 ml/min/1.73sq m     Narrative:      National Kidney Disease Foundation guidelines for Chronic Kidney Disease (CKD):     Stage 1 with normal or high GFR (GFR > 90 mL/min/1.73 square meters)    Stage 2 Mild CKD (GFR = 60-89 mL/min/1.73 square meters)    Stage 3A Moderate CKD (GFR = 45-59 mL/min/1.73 square meters)    Stage 3B Moderate CKD (GFR = 30-44 mL/min/1.73 square meters)    Stage 4 Severe CKD (GFR = 15-29 mL/min/1.73 square meters)    Stage 5 End Stage CKD (GFR <15 mL/min/1.73 square meters)  Note: GFR calculation is accurate only with a steady state creatinine    CBC and differential [[id number]]  (Abnormal) Collected: 08/05/24 1755    Lab Status: Final result Specimen: Blood from Arm, Left Updated: 08/05/24 1800     WBC 7.98 Thousand/uL      RBC 4.70 Million/uL       Hemoglobin 14.5 g/dL      Hematocrit 45.3 %      MCV 96 fL      MCH 30.9 pg      MCHC 32.0 g/dL      RDW 12.7 %      MPV 10.6 fL      Platelets 180 Thousands/uL      nRBC 0 /100 WBCs      Segmented % 78 %      Immature Grans % 0 %      Lymphocytes % 13 %      Monocytes % 9 %      Eosinophils Relative 0 %      Basophils Relative 0 %      Absolute Neutrophils 6.18 Thousands/µL      Absolute Immature Grans 0.02 Thousand/uL      Absolute Lymphocytes 1.03 Thousands/µL      Absolute Monocytes 0.70 Thousand/µL      Eosinophils Absolute 0.03 Thousand/µL      Basophils Absolute 0.02 Thousands/µL                    CT head without contrast   Final Result by Matt Godfrey MD (08/05 1944)      No acute intracranial abnormality.                  Workstation performed: [id number]                    Procedures  Procedures         ED Course  ED Course as of 08/05/24 2057   Mon Aug 05, 2024   1954 CT head: No acute intracranial abnormality.   1954 WBC: 7.98   1954 Comprehensive metabolic panel  Unremarkable    2026 TSH 3RD GENERATON: 0.936   2026 UA w Reflex to Microscopic w Reflex to Culture(!)  Negative for UTI   2044 I discussed results and findings with patient and spouse.  Patient remained at baseline while in the emergency department.  Spouse and patient feel patient is safe to return home.  Will follow-up with their neurologist in the morning.  Return precautions were discussed and both verbalized understanding.  Patient is clinically and hemodynamically stable for discharge                                 SBIRT 20yo+      Flowsheet Row Most Recent Value   Initial Alcohol Screen: US AUDIT-C     1. How often do you have a drink containing alcohol? 0 Filed at: 08/05/2024 1746   2. How many drinks containing alcohol do you have on a typical day you are drinking?  0 Filed at: 08/05/2024 1746   3a. Male UNDER 65: How often do you have five or more drinks on one occasion? 0 Filed at: 08/05/2024 1746   3b. FEMALE Any Age, or  MALE 65+: How often do you have 4 or more drinks on one occassion? 0 Filed at: 08/05/2024 1746   Audit-C Score 0 Filed at: 08/05/2024 1746   SHELBY: How many times in the past year have you...    Used an illegal drug or used a prescription medication for non-medical reasons? Never Filed at: 08/05/2024 1746                      Medical Decision Making  83-year-old male presented to the emergency department for evaluation status post episode of confusion today.  Vitals and medical record reviewed.  Back to baseline on arrival.  Spouse at bedside.  Patient at risk for the following but not limited to him CVA/TIA, dehydration, thyroid abnormality, UTI dementia.  Patient without acute neurodeficit, lower concern for stroke.  Significant electrolyte abnormality.  TSH within normal limits.  UA negative for UTI.  CT head unremarkable.  Patient remained at baseline while in the emergency department.  Has been having these episodes of confusion per the spouse.  Recommended close follow-up with neurology tomorrow and both verbalized understanding.  Patient is clinically and hemodynamically stable for discharge    Problems Addressed:  Altered mental status: acute illness or injury    Amount and/or Complexity of Data Reviewed  Independent Historian: spouse and EMS  Labs: ordered. Decision-making details documented in ED Course.  Radiology: ordered.                 Disposition  Final diagnoses:   Altered mental status     Time reflects when diagnosis was documented in both MDM as applicable and the Disposition within this note       Time User Action Codes Description Comment    8/5/2024  8:26 PM Nicky Aragon Add [R41.82] Altered mental status           ED Disposition       ED Disposition   Discharge    Condition   Stable    Date/Time   Mon Aug 5, 2024 2026    Comment   Maurizio Wesley discharge to home/self care.                   Follow-up Information       Follow up With Specialties Details Why Contact Info    Oskar Marc MD  Family Medicine   [address] [place] [id number]  [phone]      Neurology [facility]   [address]   [address]  [facility] [id number]              Discharge Medication List as of 2024  8:27 PM        CONTINUE these medications which have NOT CHANGED    Details   apixaban (ELIQUIS) 5 mg Take 1 tablet (5 mg total) by mouth 2 (two) times a day, Starting 2021, Until 2021, Normal      Ascorbic Acid (VITAMIN C) 100 MG tablet Take 100 mg by mouth daily, Historical Med      budesonide-formoterol (Symbicort) 160-4.5 mcg/act inhaler Inhale 1 puff in the morning, Historical Med      calcium acetate (PHOSLO) 667 mg capsule Take 1,334 mg by mouth 3 (three) times a day with meals, Historical Med      carbidopa-levodopa (SINEMET)  mg per tablet Take 2 tablets by mouth 3 (three) times a day, Historical Med      famotidine (PEPCID) 20 mg tablet Take 20 mg by mouth daily, Historical Med      metoprolol tartrate (LOPRESSOR) 25 mg tablet Take 12.5 mg by mouth 2 (two) times a day, Starting 2019, Historical Med      Mirabegron ER (Myrbetriq) 50 MG TB24 Take 50 mg by mouth daily, Historical Med      mometasone (NASONEX) 50 mcg/act nasal spray 2 sprays into each nostril daily, Historical Med      montelukast (SINGULAIR) 10 mg tablet Starting 2019, Historical Med      rosuvastatin (CRESTOR) 10 MG tablet Take 10 mg by mouth daily, Starting 2019, Historical Med      sertraline (ZOLOFT) 50 mg tablet Take 50 mg by mouth daily, Historical Med      tamsulosin (FLOMAX) 0.4 mg Starting Sat 2019, Historical Med             No discharge procedures on file.    PDMP Review       None            ED Provider  Electronically Signed by             Nicky Aragon PA-C  24